# Patient Record
Sex: FEMALE | Race: BLACK OR AFRICAN AMERICAN | NOT HISPANIC OR LATINO | Employment: OTHER | URBAN - METROPOLITAN AREA
[De-identification: names, ages, dates, MRNs, and addresses within clinical notes are randomized per-mention and may not be internally consistent; named-entity substitution may affect disease eponyms.]

---

## 2023-08-16 ENCOUNTER — HOSPITAL ENCOUNTER (OUTPATIENT)
Dept: DATA CONVERSION | Facility: HOSPITAL | Age: 68
Discharge: HOME | End: 2023-08-16
Payer: MEDICARE

## 2023-08-16 DIAGNOSIS — C50.912 MALIGNANT NEOPLASM OF UNSPECIFIED SITE OF LEFT FEMALE BREAST (MULTI): ICD-10-CM

## 2023-08-16 LAB
ALBUMIN SERPL-MCNC: 4 GM/DL (ref 3.5–5)
ALBUMIN/GLOB SERPL: 1.1 RATIO (ref 1.5–3)
ALP BLD-CCNC: 82 U/L (ref 35–125)
ALT SERPL-CCNC: 14 U/L (ref 5–40)
ANION GAP SERPL CALCULATED.3IONS-SCNC: 14 MMOL/L (ref 0–19)
AST SERPL-CCNC: 22 U/L (ref 5–40)
BASOPHILS # BLD AUTO: 0.02 K/UL (ref 0–0.22)
BASOPHILS NFR BLD AUTO: 0.3 % (ref 0–1)
BILIRUB SERPL-MCNC: 0.4 MG/DL (ref 0.1–1.2)
BUN SERPL-MCNC: 10 MG/DL (ref 8–25)
BUN/CREAT SERPL: 11.1 RATIO (ref 8–21)
CALCIUM SERPL-MCNC: 9.3 MG/DL (ref 8.5–10.4)
CHLORIDE SERPL-SCNC: 103 MMOL/L (ref 97–107)
CO2 SERPL-SCNC: 25 MMOL/L (ref 24–31)
CREAT SERPL-MCNC: 0.9 MG/DL (ref 0.4–1.6)
DEPRECATED RDW RBC AUTO: 43.2 FL (ref 37–54)
DIFFERENTIAL METHOD BLD: NORMAL
EOSINOPHIL # BLD AUTO: 0.11 K/UL (ref 0–0.45)
EOSINOPHIL NFR BLD: 1.7 % (ref 0–3)
ERYTHROCYTE [DISTWIDTH] IN BLOOD BY AUTOMATED COUNT: 12.8 % (ref 11.7–15)
FSH SERPL-ACNC: 107.7 MU/ML
GFR SERPL CREATININE-BSD FRML MDRD: 70 ML/MIN/1.73 M2
GLOBULIN SER-MCNC: 3.6 G/DL (ref 1.9–3.7)
GLUCOSE SERPL-MCNC: 99 MG/DL (ref 65–99)
HCT VFR BLD AUTO: 42.6 % (ref 36–44)
HGB BLD-MCNC: 14.2 GM/DL (ref 12–15)
IMM GRANULOCYTES # BLD AUTO: 0.02 K/UL (ref 0–0.1)
LYMPHOCYTES # BLD AUTO: 1.66 K/UL (ref 1.2–3.2)
LYMPHOCYTES NFR BLD MANUAL: 25 % (ref 20–40)
MCH RBC QN AUTO: 30.7 PG (ref 26–34)
MCHC RBC AUTO-ENTMCNC: 33.3 % (ref 31–37)
MCV RBC AUTO: 92 FL (ref 80–100)
MONOCYTES # BLD AUTO: 0.33 K/UL (ref 0–0.8)
MONOCYTES NFR BLD MANUAL: 5 % (ref 0–8)
NEUTROPHILS # BLD AUTO: 4.49 K/UL
NEUTROPHILS # BLD AUTO: 4.49 K/UL (ref 1.8–7.7)
NEUTROPHILS.IMMATURE NFR BLD: 0.3 % (ref 0–1)
NEUTS SEG NFR BLD: 67.7 % (ref 50–70)
NRBC BLD-RTO: 0 /100 WBC
PLATELET # BLD AUTO: 241 K/UL (ref 150–450)
PMV BLD AUTO: 11.7 CU (ref 7–12.6)
POTASSIUM SERPL-SCNC: 3.9 MMOL/L (ref 3.4–5.1)
PROT SERPL-MCNC: 7.6 G/DL (ref 5.9–7.9)
RBC # BLD AUTO: 4.63 M/UL (ref 4–4.9)
SODIUM SERPL-SCNC: 142 MMOL/L (ref 133–145)
WBC # BLD AUTO: 6.6 K/UL (ref 4.5–11)

## 2023-08-17 LAB — ESTRADIOL SERPL-MCNC: NORMAL PG/ML

## 2023-08-21 ENCOUNTER — HOSPITAL ENCOUNTER (OUTPATIENT)
Dept: DATA CONVERSION | Facility: HOSPITAL | Age: 68
End: 2023-08-21

## 2023-08-21 DIAGNOSIS — Z51.81 ENCOUNTER FOR THERAPEUTIC DRUG LEVEL MONITORING: ICD-10-CM

## 2023-08-21 DIAGNOSIS — C50.912 MALIGNANT NEOPLASM OF UNSPECIFIED SITE OF LEFT FEMALE BREAST (MULTI): ICD-10-CM

## 2023-09-21 ENCOUNTER — HOSPITAL ENCOUNTER (OUTPATIENT)
Dept: DATA CONVERSION | Facility: HOSPITAL | Age: 68
Discharge: HOME | End: 2023-09-21
Payer: MEDICARE

## 2023-09-21 DIAGNOSIS — C79.51 SECONDARY MALIGNANT NEOPLASM OF BONE (MULTI): ICD-10-CM

## 2023-09-21 DIAGNOSIS — C78.7 SECONDARY MALIGNANT NEOPLASM OF LIVER AND INTRAHEPATIC BILE DUCT (MULTI): ICD-10-CM

## 2023-09-21 DIAGNOSIS — C50.912 MALIGNANT NEOPLASM OF UNSPECIFIED SITE OF LEFT FEMALE BREAST (MULTI): ICD-10-CM

## 2023-09-21 PROBLEM — C44.509: Status: ACTIVE | Noted: 2023-09-21

## 2023-09-21 PROBLEM — C79.2 SECONDARY MALIGNANT NEOPLASM OF SKIN (MULTI): Status: ACTIVE | Noted: 2023-09-21

## 2023-09-21 PROBLEM — Q83.9 NIPPLE ANOMALY: Status: ACTIVE | Noted: 2023-09-21

## 2023-09-21 PROBLEM — I10 ESSENTIAL HYPERTENSION: Status: ACTIVE | Noted: 2023-09-21

## 2023-09-21 PROBLEM — C50.911 MALIGNANT NEOPLASM OF UNSPECIFIED SITE OF RIGHT FEMALE BREAST (MULTI): Status: ACTIVE | Noted: 2023-09-21

## 2023-09-21 PROBLEM — I10 ELEVATED BLOOD PRESSURE READING IN OFFICE WITH DIAGNOSIS OF HYPERTENSION: Status: ACTIVE | Noted: 2023-09-21

## 2023-09-21 PROBLEM — E55.9 VITAMIN D DEFICIENCY: Status: ACTIVE | Noted: 2023-09-21

## 2023-09-21 PROBLEM — I89.0 LYMPHEDEMA OF ARM: Status: ACTIVE | Noted: 2023-09-21

## 2023-09-21 RX ORDER — SENNOSIDES 8.6 MG/1
1 TABLET ORAL DAILY
COMMUNITY

## 2023-09-21 RX ORDER — TRIAMTERENE/HYDROCHLOROTHIAZID 37.5-25 MG
1 TABLET ORAL DAILY
COMMUNITY

## 2023-09-21 RX ORDER — AMLODIPINE BESYLATE 10 MG/1
1 TABLET ORAL DAILY
COMMUNITY
Start: 2021-04-21

## 2023-09-21 RX ORDER — HALOBETASOL PROPIONATE 0.5 MG/G
1 OINTMENT TOPICAL EVERY 12 HOURS
COMMUNITY
Start: 2020-11-20 | End: 2024-02-20 | Stop reason: ALTCHOICE

## 2023-09-22 VITALS — HEIGHT: 66 IN | BODY MASS INDEX: 28.98 KG/M2 | WEIGHT: 180.34 LBS

## 2023-09-22 DIAGNOSIS — C50.911 MALIGNANT NEOPLASM OF RIGHT FEMALE BREAST, UNSPECIFIED ESTROGEN RECEPTOR STATUS, UNSPECIFIED SITE OF BREAST (MULTI): Primary | ICD-10-CM

## 2023-09-22 RX ORDER — HEPARIN 100 UNIT/ML
500 SYRINGE INTRAVENOUS AS NEEDED
Status: CANCELLED | OUTPATIENT
Start: 2023-09-30

## 2023-09-22 RX ORDER — HEPARIN SODIUM,PORCINE/PF 10 UNIT/ML
50 SYRINGE (ML) INTRAVENOUS AS NEEDED
Status: CANCELLED | OUTPATIENT
Start: 2023-09-30

## 2023-10-06 ENCOUNTER — INFUSION (OUTPATIENT)
Dept: HEMATOLOGY/ONCOLOGY | Facility: CLINIC | Age: 68
End: 2023-10-06
Payer: MEDICARE

## 2023-10-06 VITALS
SYSTOLIC BLOOD PRESSURE: 163 MMHG | HEART RATE: 105 BPM | RESPIRATION RATE: 18 BRPM | TEMPERATURE: 96.8 F | BODY MASS INDEX: 29.27 KG/M2 | WEIGHT: 181.88 LBS | DIASTOLIC BLOOD PRESSURE: 99 MMHG | OXYGEN SATURATION: 98 %

## 2023-10-06 DIAGNOSIS — C50.911 MALIGNANT NEOPLASM OF RIGHT FEMALE BREAST, UNSPECIFIED ESTROGEN RECEPTOR STATUS, UNSPECIFIED SITE OF BREAST (MULTI): ICD-10-CM

## 2023-10-06 PROCEDURE — 2500000004 HC RX 250 GENERAL PHARMACY W/ HCPCS (ALT 636 FOR OP/ED): Performed by: INTERNAL MEDICINE

## 2023-10-06 PROCEDURE — 96409 CHEMO IV PUSH SNGL DRUG: CPT

## 2023-10-06 RX ORDER — HEPARIN SODIUM,PORCINE/PF 10 UNIT/ML
50 SYRINGE (ML) INTRAVENOUS AS NEEDED
Status: DISCONTINUED | OUTPATIENT
Start: 2023-10-06 | End: 2023-10-06 | Stop reason: HOSPADM

## 2023-10-06 RX ORDER — PROCHLORPERAZINE EDISYLATE 5 MG/ML
10 INJECTION INTRAMUSCULAR; INTRAVENOUS EVERY 6 HOURS PRN
Status: DISCONTINUED | OUTPATIENT
Start: 2023-10-06 | End: 2023-10-06 | Stop reason: HOSPADM

## 2023-10-06 RX ORDER — PROCHLORPERAZINE MALEATE 10 MG
10 TABLET ORAL EVERY 6 HOURS PRN
Status: DISCONTINUED | OUTPATIENT
Start: 2023-10-06 | End: 2023-10-06 | Stop reason: HOSPADM

## 2023-10-06 RX ORDER — HEPARIN 100 UNIT/ML
500 SYRINGE INTRAVENOUS AS NEEDED
Status: DISCONTINUED | OUTPATIENT
Start: 2023-10-06 | End: 2023-10-06 | Stop reason: HOSPADM

## 2023-10-06 RX ORDER — HEPARIN 100 UNIT/ML
500 SYRINGE INTRAVENOUS AS NEEDED
Status: CANCELLED | OUTPATIENT
Start: 2023-10-06

## 2023-10-06 RX ORDER — DIPHENHYDRAMINE HYDROCHLORIDE 50 MG/ML
50 INJECTION INTRAMUSCULAR; INTRAVENOUS AS NEEDED
Status: DISCONTINUED | OUTPATIENT
Start: 2023-10-06 | End: 2023-10-06 | Stop reason: HOSPADM

## 2023-10-06 RX ORDER — HEPARIN SODIUM,PORCINE/PF 10 UNIT/ML
50 SYRINGE (ML) INTRAVENOUS AS NEEDED
Status: CANCELLED | OUTPATIENT
Start: 2023-10-06

## 2023-10-06 RX ORDER — ALBUTEROL SULFATE 0.83 MG/ML
3 SOLUTION RESPIRATORY (INHALATION) AS NEEDED
Status: DISCONTINUED | OUTPATIENT
Start: 2023-10-06 | End: 2023-10-06 | Stop reason: HOSPADM

## 2023-10-06 RX ORDER — FAMOTIDINE 10 MG/ML
20 INJECTION INTRAVENOUS ONCE AS NEEDED
Status: DISCONTINUED | OUTPATIENT
Start: 2023-10-06 | End: 2023-10-06 | Stop reason: HOSPADM

## 2023-10-06 RX ORDER — EPINEPHRINE 0.3 MG/.3ML
0.3 INJECTION SUBCUTANEOUS EVERY 5 MIN PRN
Status: DISCONTINUED | OUTPATIENT
Start: 2023-10-06 | End: 2023-10-06 | Stop reason: HOSPADM

## 2023-10-06 RX ADMIN — TRASTUZUMAB 491 MG: 150 INJECTION, POWDER, LYOPHILIZED, FOR SOLUTION INTRAVENOUS at 15:24

## 2023-10-06 ASSESSMENT — COLUMBIA-SUICIDE SEVERITY RATING SCALE - C-SSRS
6. HAVE YOU EVER DONE ANYTHING, STARTED TO DO ANYTHING, OR PREPARED TO DO ANYTHING TO END YOUR LIFE?: NO
2. HAVE YOU ACTUALLY HAD ANY THOUGHTS OF KILLING YOURSELF?: NO
1. IN THE PAST MONTH, HAVE YOU WISHED YOU WERE DEAD OR WISHED YOU COULD GO TO SLEEP AND NOT WAKE UP?: NO

## 2023-10-06 ASSESSMENT — ENCOUNTER SYMPTOMS
LOSS OF SENSATION IN FEET: 0
OCCASIONAL FEELINGS OF UNSTEADINESS: 0
DEPRESSION: 0

## 2023-10-06 ASSESSMENT — PAIN SCALES - GENERAL: PAINLEVEL: 0-NO PAIN

## 2023-10-06 ASSESSMENT — PATIENT HEALTH QUESTIONNAIRE - PHQ9
SUM OF ALL RESPONSES TO PHQ9 QUESTIONS 1 AND 2: 0
1. LITTLE INTEREST OR PLEASURE IN DOING THINGS: NOT AT ALL
2. FEELING DOWN, DEPRESSED OR HOPELESS: NOT AT ALL

## 2023-10-18 ENCOUNTER — HOSPITAL ENCOUNTER (OUTPATIENT)
Dept: CARDIOLOGY | Facility: HOSPITAL | Age: 68
Discharge: HOME | End: 2023-10-18
Payer: MEDICARE

## 2023-10-18 DIAGNOSIS — Z79.899 ENCOUNTER FOR MONITORING CARDIOTOXIC DRUG THERAPY: ICD-10-CM

## 2023-10-18 DIAGNOSIS — Z51.81 ENCOUNTER FOR MONITORING CARDIOTOXIC DRUG THERAPY: ICD-10-CM

## 2023-10-18 LAB — EJECTION FRACTION APICAL 4 CHAMBER: 64

## 2023-10-18 PROCEDURE — 76376 3D RENDER W/INTRP POSTPROCES: CPT

## 2023-10-18 PROCEDURE — 93306 TTE W/DOPPLER COMPLETE: CPT | Performed by: INTERNAL MEDICINE

## 2023-10-18 PROCEDURE — 93356 MYOCRD STRAIN IMG SPCKL TRCK: CPT | Performed by: INTERNAL MEDICINE

## 2023-10-18 PROCEDURE — 76376 3D RENDER W/INTRP POSTPROCES: CPT | Performed by: INTERNAL MEDICINE

## 2023-10-18 PROCEDURE — 93306 TTE W/DOPPLER COMPLETE: CPT

## 2023-10-20 ENCOUNTER — APPOINTMENT (OUTPATIENT)
Dept: HEMATOLOGY/ONCOLOGY | Facility: CLINIC | Age: 68
End: 2023-10-20
Payer: MEDICARE

## 2023-10-27 ENCOUNTER — APPOINTMENT (OUTPATIENT)
Dept: HEMATOLOGY/ONCOLOGY | Facility: CLINIC | Age: 68
End: 2023-10-27
Payer: MEDICARE

## 2023-11-01 RX ORDER — DIPHENHYDRAMINE HYDROCHLORIDE 50 MG/ML
50 INJECTION INTRAMUSCULAR; INTRAVENOUS AS NEEDED
Status: CANCELLED | OUTPATIENT
Start: 2023-11-03

## 2023-11-01 RX ORDER — EPINEPHRINE 0.3 MG/.3ML
0.3 INJECTION SUBCUTANEOUS EVERY 5 MIN PRN
Status: CANCELLED | OUTPATIENT
Start: 2023-11-03

## 2023-11-01 RX ORDER — ALBUTEROL SULFATE 0.83 MG/ML
3 SOLUTION RESPIRATORY (INHALATION) AS NEEDED
Status: CANCELLED | OUTPATIENT
Start: 2023-11-03

## 2023-11-01 RX ORDER — PROCHLORPERAZINE MALEATE 10 MG
10 TABLET ORAL EVERY 6 HOURS PRN
Status: CANCELLED | OUTPATIENT
Start: 2023-11-03

## 2023-11-01 RX ORDER — PROCHLORPERAZINE EDISYLATE 5 MG/ML
10 INJECTION INTRAMUSCULAR; INTRAVENOUS EVERY 6 HOURS PRN
Status: CANCELLED | OUTPATIENT
Start: 2023-11-03

## 2023-11-01 RX ORDER — FAMOTIDINE 10 MG/ML
20 INJECTION INTRAVENOUS ONCE AS NEEDED
Status: CANCELLED | OUTPATIENT
Start: 2023-11-03

## 2023-11-03 ENCOUNTER — INFUSION (OUTPATIENT)
Dept: HEMATOLOGY/ONCOLOGY | Facility: CLINIC | Age: 68
End: 2023-11-03
Payer: MEDICARE

## 2023-11-03 VITALS
SYSTOLIC BLOOD PRESSURE: 156 MMHG | TEMPERATURE: 96.8 F | BODY MASS INDEX: 29.41 KG/M2 | WEIGHT: 182.76 LBS | RESPIRATION RATE: 18 BRPM | DIASTOLIC BLOOD PRESSURE: 97 MMHG | OXYGEN SATURATION: 98 %

## 2023-11-03 DIAGNOSIS — C50.911 MALIGNANT NEOPLASM OF RIGHT FEMALE BREAST, UNSPECIFIED ESTROGEN RECEPTOR STATUS, UNSPECIFIED SITE OF BREAST (MULTI): ICD-10-CM

## 2023-11-03 PROCEDURE — 2500000004 HC RX 250 GENERAL PHARMACY W/ HCPCS (ALT 636 FOR OP/ED): Mod: JW,JG | Performed by: INTERNAL MEDICINE

## 2023-11-03 PROCEDURE — 96409 CHEMO IV PUSH SNGL DRUG: CPT

## 2023-11-03 RX ORDER — DIPHENHYDRAMINE HYDROCHLORIDE 50 MG/ML
50 INJECTION INTRAMUSCULAR; INTRAVENOUS AS NEEDED
Status: DISCONTINUED | OUTPATIENT
Start: 2023-11-03 | End: 2023-11-03 | Stop reason: HOSPADM

## 2023-11-03 RX ORDER — HEPARIN 100 UNIT/ML
500 SYRINGE INTRAVENOUS AS NEEDED
Status: CANCELLED | OUTPATIENT
Start: 2023-11-03

## 2023-11-03 RX ORDER — EPINEPHRINE 0.3 MG/.3ML
0.3 INJECTION SUBCUTANEOUS EVERY 5 MIN PRN
Status: DISCONTINUED | OUTPATIENT
Start: 2023-11-03 | End: 2023-11-03 | Stop reason: HOSPADM

## 2023-11-03 RX ORDER — FAMOTIDINE 10 MG/ML
20 INJECTION INTRAVENOUS ONCE AS NEEDED
Status: DISCONTINUED | OUTPATIENT
Start: 2023-11-03 | End: 2023-11-03 | Stop reason: HOSPADM

## 2023-11-03 RX ORDER — HEPARIN SODIUM,PORCINE/PF 10 UNIT/ML
50 SYRINGE (ML) INTRAVENOUS AS NEEDED
Status: CANCELLED | OUTPATIENT
Start: 2023-11-03

## 2023-11-03 RX ORDER — PROCHLORPERAZINE MALEATE 10 MG
10 TABLET ORAL EVERY 6 HOURS PRN
Status: DISCONTINUED | OUTPATIENT
Start: 2023-11-03 | End: 2023-11-03 | Stop reason: HOSPADM

## 2023-11-03 RX ORDER — PROCHLORPERAZINE EDISYLATE 5 MG/ML
10 INJECTION INTRAMUSCULAR; INTRAVENOUS EVERY 6 HOURS PRN
Status: DISCONTINUED | OUTPATIENT
Start: 2023-11-03 | End: 2023-11-03 | Stop reason: HOSPADM

## 2023-11-03 RX ORDER — ALBUTEROL SULFATE 0.83 MG/ML
3 SOLUTION RESPIRATORY (INHALATION) AS NEEDED
Status: DISCONTINUED | OUTPATIENT
Start: 2023-11-03 | End: 2023-11-03 | Stop reason: HOSPADM

## 2023-11-03 RX ADMIN — TRASTUZUMAB 491.4 MG: 150 INJECTION, POWDER, LYOPHILIZED, FOR SOLUTION INTRAVENOUS at 11:55

## 2023-11-03 ASSESSMENT — PAIN SCALES - GENERAL: PAINLEVEL: 0-NO PAIN

## 2023-11-10 ENCOUNTER — APPOINTMENT (OUTPATIENT)
Dept: HEMATOLOGY/ONCOLOGY | Facility: CLINIC | Age: 68
End: 2023-11-10
Payer: MEDICARE

## 2023-11-15 ENCOUNTER — OFFICE VISIT (OUTPATIENT)
Dept: HEMATOLOGY/ONCOLOGY | Facility: CLINIC | Age: 68
End: 2023-11-15
Payer: MEDICARE

## 2023-11-15 VITALS
BODY MASS INDEX: 29 KG/M2 | DIASTOLIC BLOOD PRESSURE: 124 MMHG | RESPIRATION RATE: 18 BRPM | OXYGEN SATURATION: 98 % | HEART RATE: 107 BPM | SYSTOLIC BLOOD PRESSURE: 177 MMHG | TEMPERATURE: 96.6 F | WEIGHT: 180.23 LBS

## 2023-11-15 DIAGNOSIS — C50.911 MALIGNANT NEOPLASM OF RIGHT FEMALE BREAST, UNSPECIFIED ESTROGEN RECEPTOR STATUS, UNSPECIFIED SITE OF BREAST (MULTI): Primary | ICD-10-CM

## 2023-11-15 DIAGNOSIS — I89.0 LYMPHEDEMA OF ARM: ICD-10-CM

## 2023-11-15 DIAGNOSIS — I10 ESSENTIAL HYPERTENSION: ICD-10-CM

## 2023-11-15 PROCEDURE — 1160F RVW MEDS BY RX/DR IN RCRD: CPT | Performed by: INTERNAL MEDICINE

## 2023-11-15 PROCEDURE — 1036F TOBACCO NON-USER: CPT | Performed by: INTERNAL MEDICINE

## 2023-11-15 PROCEDURE — 3077F SYST BP >= 140 MM HG: CPT | Performed by: INTERNAL MEDICINE

## 2023-11-15 PROCEDURE — 1159F MED LIST DOCD IN RCRD: CPT | Performed by: INTERNAL MEDICINE

## 2023-11-15 PROCEDURE — 99417 PROLNG OP E/M EACH 15 MIN: CPT | Performed by: INTERNAL MEDICINE

## 2023-11-15 PROCEDURE — 3080F DIAST BP >= 90 MM HG: CPT | Performed by: INTERNAL MEDICINE

## 2023-11-15 PROCEDURE — 99215 OFFICE O/P EST HI 40 MIN: CPT | Performed by: INTERNAL MEDICINE

## 2023-11-15 PROCEDURE — 1126F AMNT PAIN NOTED NONE PRSNT: CPT | Performed by: INTERNAL MEDICINE

## 2023-11-15 RX ORDER — DIPHENHYDRAMINE HYDROCHLORIDE 50 MG/ML
50 INJECTION INTRAMUSCULAR; INTRAVENOUS AS NEEDED
Status: CANCELLED | OUTPATIENT
Start: 2024-04-03

## 2023-11-15 RX ORDER — EPINEPHRINE 0.3 MG/.3ML
0.3 INJECTION SUBCUTANEOUS EVERY 5 MIN PRN
Status: CANCELLED | OUTPATIENT
Start: 2023-12-01

## 2023-11-15 RX ORDER — PROCHLORPERAZINE MALEATE 10 MG
10 TABLET ORAL EVERY 6 HOURS PRN
Status: CANCELLED | OUTPATIENT
Start: 2024-02-20

## 2023-11-15 RX ORDER — PROCHLORPERAZINE MALEATE 10 MG
10 TABLET ORAL EVERY 6 HOURS PRN
Status: CANCELLED | OUTPATIENT
Start: 2024-04-03

## 2023-11-15 RX ORDER — PROCHLORPERAZINE EDISYLATE 5 MG/ML
10 INJECTION INTRAMUSCULAR; INTRAVENOUS EVERY 6 HOURS PRN
Status: CANCELLED | OUTPATIENT
Start: 2024-01-30

## 2023-11-15 RX ORDER — FAMOTIDINE 10 MG/ML
20 INJECTION INTRAVENOUS ONCE AS NEEDED
Status: CANCELLED | OUTPATIENT
Start: 2024-01-30

## 2023-11-15 RX ORDER — DIPHENHYDRAMINE HYDROCHLORIDE 50 MG/ML
50 INJECTION INTRAMUSCULAR; INTRAVENOUS AS NEEDED
Status: CANCELLED | OUTPATIENT
Start: 2024-02-20

## 2023-11-15 RX ORDER — ALBUTEROL SULFATE 0.83 MG/ML
3 SOLUTION RESPIRATORY (INHALATION) AS NEEDED
Status: CANCELLED | OUTPATIENT
Start: 2023-12-22

## 2023-11-15 RX ORDER — ALBUTEROL SULFATE 0.83 MG/ML
3 SOLUTION RESPIRATORY (INHALATION) AS NEEDED
Status: CANCELLED | OUTPATIENT
Start: 2024-02-20

## 2023-11-15 RX ORDER — FAMOTIDINE 10 MG/ML
20 INJECTION INTRAVENOUS ONCE AS NEEDED
Status: CANCELLED | OUTPATIENT
Start: 2023-12-01

## 2023-11-15 RX ORDER — PROCHLORPERAZINE EDISYLATE 5 MG/ML
10 INJECTION INTRAMUSCULAR; INTRAVENOUS EVERY 6 HOURS PRN
Status: CANCELLED | OUTPATIENT
Start: 2023-12-01

## 2023-11-15 RX ORDER — DIPHENHYDRAMINE HYDROCHLORIDE 50 MG/ML
50 INJECTION INTRAMUSCULAR; INTRAVENOUS AS NEEDED
Status: CANCELLED | OUTPATIENT
Start: 2023-12-22

## 2023-11-15 RX ORDER — PROCHLORPERAZINE EDISYLATE 5 MG/ML
10 INJECTION INTRAMUSCULAR; INTRAVENOUS EVERY 6 HOURS PRN
Status: CANCELLED | OUTPATIENT
Start: 2024-04-03

## 2023-11-15 RX ORDER — EPINEPHRINE 0.3 MG/.3ML
0.3 INJECTION SUBCUTANEOUS EVERY 5 MIN PRN
Status: CANCELLED | OUTPATIENT
Start: 2024-01-30

## 2023-11-15 RX ORDER — DIPHENHYDRAMINE HYDROCHLORIDE 50 MG/ML
50 INJECTION INTRAMUSCULAR; INTRAVENOUS AS NEEDED
Status: CANCELLED | OUTPATIENT
Start: 2024-01-30

## 2023-11-15 RX ORDER — ALBUTEROL SULFATE 0.83 MG/ML
3 SOLUTION RESPIRATORY (INHALATION) AS NEEDED
Status: CANCELLED | OUTPATIENT
Start: 2024-01-30

## 2023-11-15 RX ORDER — ALBUTEROL SULFATE 0.83 MG/ML
3 SOLUTION RESPIRATORY (INHALATION) AS NEEDED
Status: CANCELLED | OUTPATIENT
Start: 2024-04-03

## 2023-11-15 RX ORDER — EPINEPHRINE 0.3 MG/.3ML
0.3 INJECTION SUBCUTANEOUS EVERY 5 MIN PRN
Status: CANCELLED | OUTPATIENT
Start: 2023-12-22

## 2023-11-15 RX ORDER — PROCHLORPERAZINE MALEATE 10 MG
10 TABLET ORAL EVERY 6 HOURS PRN
Status: CANCELLED | OUTPATIENT
Start: 2024-01-30

## 2023-11-15 RX ORDER — ALBUTEROL SULFATE 0.83 MG/ML
3 SOLUTION RESPIRATORY (INHALATION) AS NEEDED
Status: CANCELLED | OUTPATIENT
Start: 2023-12-01

## 2023-11-15 RX ORDER — PROCHLORPERAZINE MALEATE 10 MG
10 TABLET ORAL EVERY 6 HOURS PRN
Status: CANCELLED | OUTPATIENT
Start: 2023-12-22

## 2023-11-15 RX ORDER — PROCHLORPERAZINE EDISYLATE 5 MG/ML
10 INJECTION INTRAMUSCULAR; INTRAVENOUS EVERY 6 HOURS PRN
Status: CANCELLED | OUTPATIENT
Start: 2023-12-22

## 2023-11-15 RX ORDER — FAMOTIDINE 10 MG/ML
20 INJECTION INTRAVENOUS ONCE AS NEEDED
Status: CANCELLED | OUTPATIENT
Start: 2024-04-03

## 2023-11-15 RX ORDER — EPINEPHRINE 0.3 MG/.3ML
0.3 INJECTION SUBCUTANEOUS EVERY 5 MIN PRN
Status: CANCELLED | OUTPATIENT
Start: 2024-04-03

## 2023-11-15 RX ORDER — DIPHENHYDRAMINE HYDROCHLORIDE 50 MG/ML
50 INJECTION INTRAMUSCULAR; INTRAVENOUS AS NEEDED
Status: CANCELLED | OUTPATIENT
Start: 2023-12-01

## 2023-11-15 RX ORDER — PROCHLORPERAZINE EDISYLATE 5 MG/ML
10 INJECTION INTRAMUSCULAR; INTRAVENOUS EVERY 6 HOURS PRN
Status: CANCELLED | OUTPATIENT
Start: 2024-02-20

## 2023-11-15 RX ORDER — PROCHLORPERAZINE MALEATE 10 MG
10 TABLET ORAL EVERY 6 HOURS PRN
Status: CANCELLED | OUTPATIENT
Start: 2023-12-01

## 2023-11-15 RX ORDER — FAMOTIDINE 10 MG/ML
20 INJECTION INTRAVENOUS ONCE AS NEEDED
Status: CANCELLED | OUTPATIENT
Start: 2024-02-20

## 2023-11-15 RX ORDER — FAMOTIDINE 10 MG/ML
20 INJECTION INTRAVENOUS ONCE AS NEEDED
Status: CANCELLED | OUTPATIENT
Start: 2023-12-22

## 2023-11-15 RX ORDER — EPINEPHRINE 0.3 MG/.3ML
0.3 INJECTION SUBCUTANEOUS EVERY 5 MIN PRN
Status: CANCELLED | OUTPATIENT
Start: 2024-02-20

## 2023-11-15 ASSESSMENT — COLUMBIA-SUICIDE SEVERITY RATING SCALE - C-SSRS
6. HAVE YOU EVER DONE ANYTHING, STARTED TO DO ANYTHING, OR PREPARED TO DO ANYTHING TO END YOUR LIFE?: NO
1. IN THE PAST MONTH, HAVE YOU WISHED YOU WERE DEAD OR WISHED YOU COULD GO TO SLEEP AND NOT WAKE UP?: NO
2. HAVE YOU ACTUALLY HAD ANY THOUGHTS OF KILLING YOURSELF?: NO

## 2023-11-15 ASSESSMENT — PAIN SCALES - GENERAL: PAINLEVEL: 0-NO PAIN

## 2023-11-15 ASSESSMENT — ENCOUNTER SYMPTOMS
LOSS OF SENSATION IN FEET: 0
DEPRESSION: 0
OCCASIONAL FEELINGS OF UNSTEADINESS: 0

## 2023-11-15 ASSESSMENT — PATIENT HEALTH QUESTIONNAIRE - PHQ9
1. LITTLE INTEREST OR PLEASURE IN DOING THINGS: NOT AT ALL
SUM OF ALL RESPONSES TO PHQ9 QUESTIONS 1 AND 2: 0
2. FEELING DOWN, DEPRESSED OR HOPELESS: NOT AT ALL

## 2023-11-16 ENCOUNTER — HOSPITAL ENCOUNTER (OUTPATIENT)
Dept: RADIOLOGY | Facility: HOSPITAL | Age: 68
Discharge: HOME | End: 2023-11-16
Payer: MEDICARE

## 2023-11-16 DIAGNOSIS — C50.911 MALIGNANT NEOPLASM OF RIGHT FEMALE BREAST, UNSPECIFIED ESTROGEN RECEPTOR STATUS, UNSPECIFIED SITE OF BREAST (MULTI): ICD-10-CM

## 2023-11-16 PROCEDURE — 76982 USE 1ST TARGET LESION: CPT | Mod: RT

## 2023-11-16 PROCEDURE — 76642 ULTRASOUND BREAST LIMITED: CPT | Mod: RT

## 2023-11-17 ENCOUNTER — APPOINTMENT (OUTPATIENT)
Dept: HEMATOLOGY/ONCOLOGY | Facility: CLINIC | Age: 68
End: 2023-11-17
Payer: MEDICARE

## 2023-11-17 PROBLEM — C44.509: Status: RESOLVED | Noted: 2023-09-21 | Resolved: 2023-11-17

## 2023-11-17 PROBLEM — C79.2 SECONDARY MALIGNANT NEOPLASM OF SKIN (MULTI): Status: RESOLVED | Noted: 2023-09-21 | Resolved: 2023-11-17

## 2023-11-17 RX ORDER — ANASTROZOLE 1 MG/1
1 TABLET ORAL DAILY
COMMUNITY
End: 2024-01-17 | Stop reason: SINTOL

## 2023-11-18 NOTE — PROGRESS NOTES
DIVISION OF MEDICAL ONCOLOGY    Patient ID: Debra Sheppard is a 68 y.o. female.  MRN: [id number]  : 1955  The patient presents to clinic today for her history of metastatic breast cancer.    Diagnostic/Therapeutic History:  Diagnosis: Breast cancer, Stage IV (Primary, Right breast overlapping sites, T4c, NX, cM1, G2, ER Status: Positive, PA Status: Negative, HER-2/yue  Status: Positive)-Clinical ).     She noted a right breast mass for several years. This progressively worsened with involvement of the right chest wall, shoulder along with a mass on her back. She had a CT scan of her chest and abdomen that showed multiple hypodense lesions throughout  the liver- borderline left external iliac lymph nodes that are multiple small soft tissue nodules present in the right anterior omentum. -3.1 x 2.5 soft tissue lesion in the subcutaneous tissue of the posterior chest. There is tiny nonspecific sclerotic  lesion in the left femoral neck, right iliac and left acetabulum.  Her CT scan of the chest revealed several nodules in the lung bilaterally. There is also multiple large axillary lymph nodes along with large infiltrative right breast mass involving the entire breast and underlying chest wall. She has a 2.9 soft tissue  nodule in the chest posteriorly to the left.  She was not agreeable to chemotherapy, but to Herceptin and anastrozole.  She was not interested in bisphosphonates or denosumab.    History of Present Illness (HPI)/Interval History:  Debra Sheppard presents today for routine FUV.  She has noted some right hand swelling recently. No pain.  No pain in the breast or reported changes.  She had a recent URI, seen at [facility], given a Z-pack with improvement. Occasional mucous noted, but no fevers or worsening symptoms.  No dyspnea, abdominal pain, nausea.  She had some hives after her last infusion- these have now resolved. She reiterates the need for brand-name Herceptin, not generic trastuzumab.  She is interested  in checking her estrogen/FSH levels and considering decreasing her anastrozole dose, if possible.  She wonders what Dr. Stallworth (her prior oncologist) wanted to switch her treatment regimen to, and she would like me to review prior notes from years ago and let her know.    Review of Systems:  14-point ROS otherwise negative, as per HPI/Interval History.    Past Medical History:   Diagnosis Date    Breast cancer (CMS/HCC)     Hypertension      Patient Active Problem List   Diagnosis    Elevated blood pressure reading in office with diagnosis of hypertension    Essential hypertension    Lymphedema of arm    Malignant neoplasm of unspecified site of right female breast (CMS/HCC)    Nipple anomaly    Vitamin D deficiency      Social History     Socioeconomic History    Marital status:      Spouse name: None    Number of children: None    Years of education: None    Highest education level: None   Occupational History    None   Tobacco Use    Smoking status: Never     Passive exposure: Never    Smokeless tobacco: Never   Vaping Use    Vaping Use: Never used   Substance and Sexual Activity    Alcohol use: Never    Drug use: Never    Sexual activity: None   Other Topics Concern    None   Social History Narrative    None     Social Determinants of Health     Financial Resource Strain: Not on file   Food Insecurity: Not on file   Transportation Needs: Not on file   Physical Activity: Not on file   Stress: Not on file   Social Connections: Not on file   Intimate Partner Violence: Not on file   Housing Stability: Not on file       Family History   Problem Relation Name Age of Onset    Breast cancer Sister          Triple-negative BC       Allergies:   Allergies   Allergen Reactions    Ciprofloxacin Itching    Latex Unknown    Penicillins Unknown    Surgical Lubricant Jelly Unknown    Adhesive Rash         Current Outpatient Medications:     amLODIPine (Norvasc) 10 mg tablet, Take 1 tablet (10 mg) by mouth once daily.,  Disp: , Rfl:     anastrozole (Arimidex) 1 mg tablet, Take 1 tablet (1 mg total) by mouth once daily.  Swallow whole with a drink of water., Disp: , Rfl:     camphor-menthoL (Sarna) lotion, Apply topically if needed for itching. Take per directed, Disp: , Rfl:     halobetasol (UltraVATE) 0.05 % ointment, 1 Application every 12 hours. Take per directed, Disp: , Rfl:     sennosides (Senokot) 8.6 mg tablet, Take 1 tablet (8.6 mg) by mouth once daily. Pv Senna-S 60 Ct, Disp: , Rfl:     triamterene-hydrochlorothiazid (Maxzide-25) 37.5-25 mg tablet, Take 1 tablet by mouth once daily., Disp: , Rfl:      Objective    BSA: 1.95 meters squared  BP (!) 177/124   Pulse 107   Temp 35.9 °C (96.6 °F) (Temporal)   Resp 18   Wt 81.8 kg (180 lb 3.6 oz)   SpO2 98%   BMI 29.00 kg/m²     ECOG Performance Status:  The ECOG performance scale today is ECO- Restricted in physically strenuous activity.  Carries out light duty.    Physical Exam  Constitutional:       General: She is not in acute distress.     Appearance: Normal appearance.   HENT:      Head: Normocephalic and atraumatic.   Eyes:      General: No scleral icterus.  Cardiovascular:      Rate and Rhythm: Normal rate and regular rhythm.      Heart sounds: No murmur heard.  Pulmonary:      Effort: No respiratory distress.      Breath sounds: Normal breath sounds.   Musculoskeletal:      Right lower leg: No edema.      Left lower leg: No edema.      Comments: Right hand edema noted. No edema right arm. +Right axillary fullness noted without discrete mass.   Skin:     General: Skin is warm and dry.   Neurological:      General: No focal deficit present.      Mental Status: She is alert and oriented to person, place, and time. Mental status is at baseline.      Gait: Gait normal.         Laboratory Data:  Lab Results   Component Value Date    WBC 6.6 2023    HGB 14.2 2023    HCT 42.6 2023    MCV 92.0 2023     2023       Chemistry    Lab  Results   Component Value Date/Time     08/16/2023 1400    K 3.9 08/16/2023 1400     08/16/2023 1400    CO2 25 08/16/2023 1400    BUN 10 08/16/2023 1400    CREATININE 0.9 08/16/2023 1400    Lab Results   Component Value Date/Time    CALCIUM 9.3 08/16/2023 1400    ALKPHOS 82 08/16/2023 1400    AST 22 08/16/2023 1400    ALT 14 08/16/2023 1400    BILITOT 0.4 08/16/2023 1400        Radiology:  STUDY:  BI US BREAST LIMITED RIGHT; 11/16/2023 8:04 am      INDICATION:  Signs/Symptoms:Right axillary fullness and arm swelling.. Patient has  known breast malignancy with metastatic disease. Recent CT 09/21/2023  demonstrated right breast mass along with right axillary tail mass.      COMPARISON:  Only available comparison study is the CT 09/21/2023 and an  ultrasound of the right breast from 06/13/2022 from [facility].      ACCESSION NUMBER(S):  [id number]      ORDERING CLINICIAN:  LAURENCE TEMPLETON      TECHNIQUE:  Grey scale duplex image of the breast tissue was obtained.      FINDINGS:  Exam was targeted to the patient's right breast mass and right  axillary tail mass.      *Within the right axillary tail there is a solid macrolobulated mass  with marked increased stiffness on elastography without internal  vascularity demonstrated measuring 2.0 x 2.2 x 2.4 cm. On the outside  previous examination the axillary tail mass measuring approximately  1.2 x 1.8 cm. *There is a right breast mass 11 o'clock position with  a distance nipple of 4 cm measuring approximately 1.0 x 1.0 x 1.3 cm  demonstrating spiculated margins with acoustic shadowing and  increased stiffness on elastography. On the previous ultrasound exam  this measured approximately 0.4 x 0.4 x 0.7 cm.      IMPRESSION:  Examination was targeted to the right breast mass and right axillary  tail mass as detailed above both of which appear to have increased in  size compared to 06/13/2022 ultrasound study. No previous  mammographic imaging is  available.    STUDY:  CHEST ABD PELV WO IV 9/21/2023 11:00 am     INDICATION:  MALIGNANT NEOPLASM OF UNSPECIFIED SITE OF LEFT FEMALE BREAST 68-year-old  woman with reported history of breast cancer and liver malignancy. Presents  for restaging examination.     COMPARISON:  No prior CT studies available in our system for direct comparison. I have  reached out to  [facility] who spent the day searching for available prior  studies. However, no recent previous CT could be found.     ACCESSION NUMBER(S):  [id number]     ORDERING CLINICIAN:  KARELY NUNEZ     TECHNIQUE:  CT axial images through the chest, abdomen, and pelvis without intravenous  contrast. Oral contrast was not administered. Post processing sagittal and  coronal reconstruction images were also performed.     COMMENTS: This exam is performed without oral or intravenous contrast as was  requested. Please note that the absence of oral and intravenous contrast  material does limit the sensitivity and specificity of this examination. In  particular solid organ assessment for neoplasm is significantly limited.  Assessment of the GI tract is also limited without the aid of oral contrast  administration. Vascular abnormalities such as dissection, occlusions,  infarcts and thrombus may also go undetected.     FINDINGS:  CHEST:     NECK, THORACIC INLET, AXILLAE: Within the limitations of an unenhanced scan,  the imaged thyroid is unremarkable.     CORIN: Calcified left hilar lymph nodes are seen.     MEDIASTINUM: Mediastinal lymph nodes are not pathologically enlarged, within  the limitations of an unenhanced scan. The esophagus is patulous in  appearance.     HEART and PERICARDIUM: The ascending thoracic aorta measures 3.2 cm. The  main pulmonary artery is not enlarged. There is no pericardial effusion.     PLEURA: There is no pleural effusion or pneumothorax.     LUNGS/AIRWAYS: Central airways are patent.     Right lung: There is a 4 mm pulmonary nodule in the  right upper lobe, axial  image 132. Calcified right lung granuloma, axial image 132.     Left lung: 3-4 mm left perifissural nodule, axial image 145. 2 mm lingular  nodule, axial image 162.     CHEST CAGE/BONES: There is a 2.2 x 2.1 cm possible lateral right breast  mass, as seen on series 3, image 24.  multilevel discogenic degenerative  changes of the thoracic spine.              ABDOMEN:     HEPATOBILIARY: A subcentimeter hypodensity is identified at the hepatic  dome, axial image 73, too small to characterize by CT. The gallbladder is  unremarkable.     PANCREAS: The pancreas is unremarkable, within the limitations of this  unenhanced scan.     SPLEEN: The spleen is not enlarged. Calcified splenic granulomas are seen.     ADRENALS: The right and left adrenal glands are unremarkable.     URINARY: No sign of calcified renal collecting system stones or  hydronephrosis.     GASTROINTESTINAL: There is a small hiatal hernia. The stomach is contracted  and therefore not fully evaluated.  Visualized small bowel loops demonstrate  normal caliber. Portions of the large bowel are decompressed, limiting  evaluation for wall thickening. There is colonic diverticulosis. Moderate  amount of stool is seen throughout the colon. The appendix is unremarkable.     PELVIS: No sign of calcified urinary bladder stone. No sign of free pelvic  fluid. Several calcified pelvic phleboliths are present.     REPRODUCTIVE ORGANS: Suspected uterine fibroid (series 3, image 175).     LYMPH NODES: No sign of retroperitoneal or mesenteric lymphadenopathy.     VASCULAR: There is no abdominal aortic aneurysm.     BONES: Discogenic degenerative changes of the lumbar spine are most  pronounced at L5-S1. Vacuum disc phenomenon noted at this level.     SOFT TISSUES: Small fat containing umbilical hernia.     IMPRESSION:  1. Please note limitation in this restaging exam due to lack of IV and oral  contrast. Furthermore, despite efforts from the [facility]  team throughout the  day to obtain outside hospital prior studies, none could be found and  uploaded for direct comparison.     2. There is a 2.2 x 2.1 cm possible lateral right breast mass, series 3,  image 24. Correlation with patient's history, and potentially mammography,  recommended.     3. A 4 mm pulmonary nodule is seen in the right upper lobe. Left lung  pulmonary nodules measure up to 3-4 mm. Correlation with outside hospital  studies, if they can be obtained. Otherwise, by Fleischner Society  guidelines, chest CT follow-up in 12 months recommended for these nodules <6  mm.     4. No acute finding in the abdomen or pelvis.    Pathology: N/A      Assessment/Plan:  Debra Sheppard is a 68 y.o. female with a history of metastatic ER+/Her2+ breast cancer, who presents today for follow-up evaluation on anastrozole and trastuzumab (Herceptin).    Malignant neoplasm of unspecified site of right female breast (CMS/HCC)  Stage IV (de adalid), ER+/Her2+, with LN, subcutaneous tissue, bone, lung, liver metastases at diagnosis.    - Breast exam deferred today, per patient preference.  - Right axilla with fullness on exam. I recommended obtaining a right breast/axillary US for better evaluation. US study was performed day after clinic visit- compared to study from 6/2022, breast and axillary mass have increased in size. I am unable to contact the patient due to her desire to restrict all contact information in her chart. I have spoken with our nurse manager, and we will attempt to find a way to contact her prior to her next infusion.  - I am also unable to compare her CT CAP from 9/2023 to prior studies, as she restricts access to prior outside scans. She should consider updated CT (+/- bone scan), as her breast/axillary imaging has shown possible progression.  - I explained that I do not recommend atypical dosing of anastrozole. Continue 1 mg daily for now.  - Additional Herceptin infusions have been scheduled q21 days.  -  ECHO with normal LVEF 10/2023. Continue q6 month monitoring, next due 4/2024.    Essential hypertension  BP again elevated today, but asymptomatic.  Continue triamterene-HCTZ and amlodipine. Continue to follow with PCP.    Lymphedema of arm  Lymphedema noted in hand. She has a compression sleeve. Instructed to call with worsening symptoms.     Disposition.  - RTC ~12 weeks. Continue q3 week Herceptin.  - She has our contact information and was instructed to call with concerns/questions in the interim.    Moe Watts MD  Hematology and Medical Oncology  [facility]

## 2023-11-18 NOTE — ASSESSMENT & PLAN NOTE
Lymphedema noted in hand. She has a compression sleeve. Instructed to call with worsening symptoms.

## 2023-11-18 NOTE — ASSESSMENT & PLAN NOTE
BP again elevated today, but asymptomatic.  Continue triamterene-HCTZ and amlodipine. Continue to follow with PCP.

## 2023-11-18 NOTE — ASSESSMENT & PLAN NOTE
Stage IV (de adalid), ER+/Her2+, with LN, subcutaneous tissue, bone, lung, liver metastases at diagnosis.    - Breast exam deferred today, per patient preference.  - Right axilla with fullness on exam. I recommended obtaining a right breast/axillary US for better evaluation. US study was performed day after clinic visit- compared to study from 6/2022, breast and axillary mass have increased in size. I am unable to contact the patient due to her desire to restrict all contact information in her chart. I have spoken with our nurse manager, and we will attempt to find a way to contact her prior to her next infusion.  - I am also unable to compare her CT CAP from 9/2023 to prior studies, as she restricts access to prior outside scans. She should consider updated CT (+/- bone scan), as her breast/axillary imaging has shown possible progression.  - I explained that I do not recommend atypical dosing of anastrozole. Continue 1 mg daily for now.  - Additional Herceptin infusions have been scheduled q21 days.  - ECHO with normal LVEF 10/2023. Continue q6 month monitoring, next due 4/2024.

## 2023-12-01 ENCOUNTER — APPOINTMENT (OUTPATIENT)
Dept: HEMATOLOGY/ONCOLOGY | Facility: CLINIC | Age: 68
End: 2023-12-01
Payer: MEDICARE

## 2023-12-01 ENCOUNTER — DOCUMENTATION (OUTPATIENT)
Dept: HEMATOLOGY/ONCOLOGY | Facility: CLINIC | Age: 68
End: 2023-12-01
Payer: MEDICARE

## 2023-12-01 ENCOUNTER — INFUSION (OUTPATIENT)
Dept: HEMATOLOGY/ONCOLOGY | Facility: CLINIC | Age: 68
End: 2023-12-01
Payer: MEDICARE

## 2023-12-01 VITALS
DIASTOLIC BLOOD PRESSURE: 93 MMHG | OXYGEN SATURATION: 98 % | WEIGHT: 179.45 LBS | SYSTOLIC BLOOD PRESSURE: 150 MMHG | TEMPERATURE: 96.8 F | BODY MASS INDEX: 28.88 KG/M2

## 2023-12-01 DIAGNOSIS — C50.911 MALIGNANT NEOPLASM OF RIGHT FEMALE BREAST, UNSPECIFIED ESTROGEN RECEPTOR STATUS, UNSPECIFIED SITE OF BREAST (MULTI): ICD-10-CM

## 2023-12-01 PROCEDURE — 2500000004 HC RX 250 GENERAL PHARMACY W/ HCPCS (ALT 636 FOR OP/ED): Mod: JG | Performed by: INTERNAL MEDICINE

## 2023-12-01 PROCEDURE — 96413 CHEMO IV INFUSION 1 HR: CPT

## 2023-12-01 RX ORDER — ALBUTEROL SULFATE 0.83 MG/ML
3 SOLUTION RESPIRATORY (INHALATION) AS NEEDED
Status: DISCONTINUED | OUTPATIENT
Start: 2023-12-01 | End: 2023-12-01 | Stop reason: HOSPADM

## 2023-12-01 RX ORDER — EPINEPHRINE 0.3 MG/.3ML
0.3 INJECTION SUBCUTANEOUS EVERY 5 MIN PRN
Status: DISCONTINUED | OUTPATIENT
Start: 2023-12-01 | End: 2023-12-01 | Stop reason: HOSPADM

## 2023-12-01 RX ORDER — PROCHLORPERAZINE EDISYLATE 5 MG/ML
10 INJECTION INTRAMUSCULAR; INTRAVENOUS EVERY 6 HOURS PRN
Status: DISCONTINUED | OUTPATIENT
Start: 2023-12-01 | End: 2023-12-01 | Stop reason: HOSPADM

## 2023-12-01 RX ORDER — PROCHLORPERAZINE MALEATE 10 MG
10 TABLET ORAL EVERY 6 HOURS PRN
Status: DISCONTINUED | OUTPATIENT
Start: 2023-12-01 | End: 2023-12-01 | Stop reason: HOSPADM

## 2023-12-01 RX ORDER — DIPHENHYDRAMINE HYDROCHLORIDE 50 MG/ML
50 INJECTION INTRAMUSCULAR; INTRAVENOUS AS NEEDED
Status: DISCONTINUED | OUTPATIENT
Start: 2023-12-01 | End: 2023-12-01 | Stop reason: HOSPADM

## 2023-12-01 RX ORDER — FAMOTIDINE 10 MG/ML
20 INJECTION INTRAVENOUS ONCE AS NEEDED
Status: DISCONTINUED | OUTPATIENT
Start: 2023-12-01 | End: 2023-12-01 | Stop reason: HOSPADM

## 2023-12-01 RX ADMIN — TRASTUZUMAB 490.8 MG: 150 INJECTION, POWDER, LYOPHILIZED, FOR SOLUTION INTRAVENOUS at 15:45

## 2023-12-01 ASSESSMENT — PAIN SCALES - GENERAL: PAINLEVEL: 0-NO PAIN

## 2023-12-01 NOTE — PROGRESS NOTES
"Patient was seen in infusion today, room 10, prior to her port access and her treatment. She is seated in room , on bed, television on in back round , alone, pleasant and calm and welcomed my visit.    Debra started our visit by describing her health concerns in great detail. She described that she has concerns regarding her most recent scans and she states that per Dr. Watts , he had discussed  with her that ,\" He will monitor these areas of concern as they may be fungal in nature  or may be caused by an infection of 'histomosis'.\"which she states ,\"may have been contracted years ago which could have caused her asthma.\" She is asking if Dr. Watts will order the lab test to check this. Debra continues to describe other health concerns and when she was finished, I explained to her that Dr. Watts is a very thorough and competent physician who strives to provide her with the best quality of care possible. However, it was explained to her that is it very difficult for him to do so when he does not have a way for him to contact her with results, questions, concerns or  need for discussions regarding her treatment plans. She was asked if she would be willing to provide him with a telephone number where she can be reached and she refused. She states that she , \"just can't do it at the moment, I just don't trust anyone. I've had too many bad things happen to me - look what happened to the [facility] and my friend in medical records said that people look in peoples charts and call their phones and hang up all the time. Just No.\" I then explained to her that  strives to secure our patients medical records privacy as their number one priority and that security is continually monitored, however she continued to decline.  She was also offered to have her chart listed as confidential and she also refused this option. I then explained to her that it was my job to let Dr. Watts know that she has refused to provide her " "contact phone number and that I did not know what he would do in terms of ordering her wanted tests as he had no way of getting in touch with her. She stated, \"I like things the way they are now. Why can't they just stay the same?\" I told her that I would let Dr. Watts know the details of our conversation, we said our goodbyes and I exited the room. I was called back to room in appx 10 minutes by the patient and she asked me to ask Dr. Watts to please order her the histoplasmosis blood test. Patient was told that Dr. Watts would be made aware of her request, but again, as he has no way of reaching her with results, it was not guaranteed that he would order this.  Patient verbalized understanding.    "

## 2023-12-06 ENCOUNTER — OFFICE VISIT (OUTPATIENT)
Dept: HEMATOLOGY/ONCOLOGY | Facility: CLINIC | Age: 68
End: 2023-12-06
Payer: MEDICARE

## 2023-12-06 VITALS
WEIGHT: 177.47 LBS | TEMPERATURE: 96.1 F | HEART RATE: 99 BPM | DIASTOLIC BLOOD PRESSURE: 103 MMHG | SYSTOLIC BLOOD PRESSURE: 173 MMHG | OXYGEN SATURATION: 97 % | BODY MASS INDEX: 28.56 KG/M2 | RESPIRATION RATE: 20 BRPM

## 2023-12-06 DIAGNOSIS — C50.911 MALIGNANT NEOPLASM OF RIGHT BREAST IN FEMALE, ESTROGEN RECEPTOR POSITIVE, UNSPECIFIED SITE OF BREAST (MULTI): Primary | ICD-10-CM

## 2023-12-06 DIAGNOSIS — R06.02 SHORTNESS OF BREATH: ICD-10-CM

## 2023-12-06 DIAGNOSIS — K29.70 GASTRITIS WITHOUT BLEEDING, UNSPECIFIED CHRONICITY, UNSPECIFIED GASTRITIS TYPE: ICD-10-CM

## 2023-12-06 DIAGNOSIS — Z17.0 MALIGNANT NEOPLASM OF RIGHT BREAST IN FEMALE, ESTROGEN RECEPTOR POSITIVE, UNSPECIFIED SITE OF BREAST (MULTI): Primary | ICD-10-CM

## 2023-12-06 PROCEDURE — 1160F RVW MEDS BY RX/DR IN RCRD: CPT | Performed by: INTERNAL MEDICINE

## 2023-12-06 PROCEDURE — 1036F TOBACCO NON-USER: CPT | Performed by: INTERNAL MEDICINE

## 2023-12-06 PROCEDURE — 3080F DIAST BP >= 90 MM HG: CPT | Performed by: INTERNAL MEDICINE

## 2023-12-06 PROCEDURE — 3077F SYST BP >= 140 MM HG: CPT | Performed by: INTERNAL MEDICINE

## 2023-12-06 PROCEDURE — 99215 OFFICE O/P EST HI 40 MIN: CPT | Performed by: INTERNAL MEDICINE

## 2023-12-06 PROCEDURE — 1126F AMNT PAIN NOTED NONE PRSNT: CPT | Performed by: INTERNAL MEDICINE

## 2023-12-06 PROCEDURE — 1159F MED LIST DOCD IN RCRD: CPT | Performed by: INTERNAL MEDICINE

## 2023-12-06 RX ORDER — SUCRALFATE 1 G/10ML
1 SUSPENSION ORAL
Qty: 1200 ML | Refills: 1 | Status: SHIPPED | OUTPATIENT
Start: 2023-12-06 | End: 2024-02-04

## 2023-12-06 ASSESSMENT — ENCOUNTER SYMPTOMS
OCCASIONAL FEELINGS OF UNSTEADINESS: 0
LOSS OF SENSATION IN FEET: 0
DEPRESSION: 0

## 2023-12-06 ASSESSMENT — PAIN SCALES - GENERAL: PAINLEVEL: 0-NO PAIN

## 2023-12-06 NOTE — PATIENT INSTRUCTIONS
Ginger Richardson, it was a pleasure seeing you today in clinic.    Based on the arm symptoms you are having, and results of the recent ultrasound, I recommend obtaining an updated CT scan to assess the lungs and other parts of the body.    A referral to gastroenterology has been placed.    A prescription for Carafate has been sent to your pharmacy- you can use this as needed for heartburn.

## 2023-12-06 NOTE — PROGRESS NOTES
"Pt seen in office today for a follow up visit with Dr. Watts for management of her breast cancer.  She appears SOB today, POX is 97% on RA.    Per orders, she is to have a CT scan c/a/p without IV contrast  and is to begin on carafate for GI upset. Due to the fact that she refuses to give the medical team her phone number for contact, a FUV cannot be scheduled at th is time to discuss the CT scan results as Dr. Watts will be out on personal leave from 23 through 24.  Patient has requested a copy of the CT scan order because she wants to have this done, \" at an outside, non  hospital\". Pt has been made awae that Dr. Watts will not be able to see these results if it is done at an outside facility. Order has been printed, sealed in an envelope and given to patient, per her request. She has given me the wrapper of an albuterol package and has asked for Dr. Watts to refill this but has asked that he not refill it with\" medication, as it says on this package\". Dr. Watts has been made aware that she is in need of a refill.    Our contact information was given to patient and they were encouraged to contact us with any questions or concerns.     Patient verbalized understanding and agreement regarding discussed information via verbal feedback. Pt escorted to scheduling.   "

## 2023-12-07 DIAGNOSIS — J45.909 ASTHMA, UNSPECIFIED ASTHMA SEVERITY, UNSPECIFIED WHETHER COMPLICATED, UNSPECIFIED WHETHER PERSISTENT (HHS-HCC): Primary | ICD-10-CM

## 2023-12-07 PROBLEM — R06.02 SHORTNESS OF BREATH: Status: ACTIVE | Noted: 2023-12-07

## 2023-12-07 PROBLEM — K29.70 GASTRITIS WITHOUT BLEEDING: Status: ACTIVE | Noted: 2023-12-07

## 2023-12-07 RX ORDER — ALBUTEROL SULFATE 0.63 MG/3ML
0.63 SOLUTION RESPIRATORY (INHALATION) EVERY 6 HOURS PRN
Qty: 75 ML | Refills: 1 | Status: SHIPPED | OUTPATIENT
Start: 2023-12-07 | End: 2024-12-06

## 2023-12-07 NOTE — PROGRESS NOTES
DIVISION OF MEDICAL ONCOLOGY    Patient ID: Debra Sheppard is a 68 y.o. female.  MRN: [id number]  : 1955  The patient presents to clinic today for her history of metastatic breast cancer.    Diagnostic/Therapeutic History:  Diagnosis: Breast cancer, Stage IV (Primary, Right breast overlapping sites, T4c, NX, cM1, G2, ER Status: Positive, GA Status: Negative, HER-2/yue  Status: Positive)-Clinical ).     She noted a right breast mass for several years. This progressively worsened with involvement of the right chest wall, shoulder along with a mass on her back. She had a CT scan of her chest and abdomen that showed multiple hypodense lesions throughout  the liver- borderline left external iliac lymph nodes that are multiple small soft tissue nodules present in the right anterior omentum. -3.1 x 2.5 soft tissue lesion in the subcutaneous tissue of the posterior chest. There is tiny nonspecific sclerotic  lesion in the left femoral neck, right iliac and left acetabulum.  Her CT scan of the chest revealed several nodules in the lung bilaterally. There is also multiple large axillary lymph nodes along with large infiltrative right breast mass involving the entire breast and underlying chest wall. She has a 2.9 soft tissue  nodule in the chest posteriorly to the left.  She was not agreeable to chemotherapy, but to Herceptin and anastrozole.  She was not interested in bisphosphonates or denosumab.    History of Present Illness (HPI)/Interval History:  Debra Sheppard presents today for FUV and discussion of recent ultrasound results.  She has note some shortness of breath recently, which she wonders if is due to her asthma.  She would like a refill on her albuterol. She has come cough. No fevers.  She has been speaking to her friend who is an oncologist, and she worries about how her Herceptin is reconstituted and who transports the medication from pharmacy to the infusion room. She would like information on how this is  done.  No abdominal pain.  She has a lot of mucous and occasional nausea. Heartburn also noted. She would like a referral for an endoscopy.    Review of Systems:  14-point ROS otherwise negative, as per HPI/Interval History.    Past Medical History:   Diagnosis Date    Breast cancer (CMS/HCC)     Hypertension      Patient Active Problem List   Diagnosis    Elevated blood pressure reading in office with diagnosis of hypertension    Essential hypertension    Lymphedema of arm    Malignant neoplasm of unspecified site of right female breast (CMS/HCC)    Nipple anomaly    Vitamin D deficiency    Shortness of breath    Gastritis without bleeding      Social History     Socioeconomic History    Marital status:      Spouse name: None    Number of children: None    Years of education: None    Highest education level: None   Occupational History    None   Tobacco Use    Smoking status: Never     Passive exposure: Never    Smokeless tobacco: Never   Vaping Use    Vaping Use: Never used   Substance and Sexual Activity    Alcohol use: Never    Drug use: Never    Sexual activity: None   Other Topics Concern    None   Social History Narrative    None     Social Determinants of Health     Financial Resource Strain: Not on file   Food Insecurity: Not on file   Transportation Needs: Not on file   Physical Activity: Not on file   Stress: Not on file   Social Connections: Not on file   Intimate Partner Violence: Not on file   Housing Stability: Not on file       Family History   Problem Relation Name Age of Onset    Breast cancer Sister          Triple-negative BC       Allergies:   Allergies   Allergen Reactions    Ciprofloxacin Itching    Latex Unknown    Penicillins Unknown    Surgical Lubricant Jelly Unknown    Adhesive Rash    Sulfa (Sulfonamide Antibiotics) Rash         Current Outpatient Medications:     amLODIPine (Norvasc) 10 mg tablet, Take 1 tablet (10 mg) by mouth once daily., Disp: , Rfl:     anastrozole (Arimidex)  1 mg tablet, Take 1 tablet (1 mg total) by mouth once daily.  Swallow whole with a drink of water., Disp: , Rfl:     camphor-menthoL (Sarna) lotion, Apply topically if needed for itching. Take per directed, Disp: , Rfl:     halobetasol (UltraVATE) 0.05 % ointment, 1 Application every 12 hours. Take per directed, Disp: , Rfl:     sennosides (Senokot) 8.6 mg tablet, Take 1 tablet (8.6 mg) by mouth once daily. Pv Senna-S 60 Ct, Disp: , Rfl:     sucralfate (Carafate) 100 mg/mL suspension, Take 10 mL (1 g) by mouth 4 times a day with meals., Disp: 1200 mL, Rfl: 1    triamterene-hydrochlorothiazid (Maxzide-25) 37.5-25 mg tablet, Take 1 tablet by mouth once daily., Disp: , Rfl:      Objective    BSA: 1.94 meters squared  BP (!) 173/103 (BP Location: Left arm, Patient Position: Sitting, BP Cuff Size: Adult)   Pulse 99   Temp 35.6 °C (96.1 °F) (Temporal)   Resp 20   Wt 80.5 kg (177 lb 7.5 oz)   SpO2 97%   BMI 28.56 kg/m²     ECOG Performance Status:  The ECOG performance scale today is ECO- Restricted in physically strenuous activity.  Carries out light duty.    Physical Exam  Constitutional:       General: She is not in acute distress.     Appearance: Normal appearance. She is not diaphoretic.   HENT:      Head: Normocephalic and atraumatic.   Eyes:      General: No scleral icterus.     Conjunctiva/sclera: Conjunctivae normal.   Cardiovascular:      Rate and Rhythm: Normal rate and regular rhythm.      Heart sounds: No murmur heard.  Pulmonary:      Breath sounds: No stridor. Wheezing and rhonchi (rare) present.      Comments: Shortness of breath noted when talking. Speaks in full sentences.  Musculoskeletal:      Comments: Right hand edema noted. No edema right arm. +Right axillary fullness noted without discrete mass.   Skin:     General: Skin is warm and dry.   Neurological:      General: No focal deficit present.      Mental Status: She is alert and oriented to person, place, and time. Mental status is at  baseline.         Laboratory Data:  Lab Results   Component Value Date    WBC 6.6 08/16/2023    HGB 14.2 08/16/2023    HCT 42.6 08/16/2023    MCV 92.0 08/16/2023     08/16/2023       Chemistry    Lab Results   Component Value Date/Time     08/16/2023 1400    K 3.9 08/16/2023 1400     08/16/2023 1400    CO2 25 08/16/2023 1400    BUN 10 08/16/2023 1400    CREATININE 0.9 08/16/2023 1400    Lab Results   Component Value Date/Time    CALCIUM 9.3 08/16/2023 1400    ALKPHOS 82 08/16/2023 1400    AST 22 08/16/2023 1400    ALT 14 08/16/2023 1400    BILITOT 0.4 08/16/2023 1400        Radiology:  STUDY:  BI US BREAST LIMITED RIGHT; 11/16/2023 8:04 am      INDICATION:  Signs/Symptoms:Right axillary fullness and arm swelling.. Patient has  known breast malignancy with metastatic disease. Recent CT 09/21/2023  demonstrated right breast mass along with right axillary tail mass.      COMPARISON:  Only available comparison study is the CT 09/21/2023 and an  ultrasound of the right breast from 06/13/2022 from [facility].      ACCESSION NUMBER(S):  [id number]      ORDERING CLINICIAN:  LAURENCE TEMPLETON      TECHNIQUE:  Grey scale duplex image of the breast tissue was obtained.      FINDINGS:  Exam was targeted to the patient's right breast mass and right  axillary tail mass.      *Within the right axillary tail there is a solid macrolobulated mass  with marked increased stiffness on elastography without internal  vascularity demonstrated measuring 2.0 x 2.2 x 2.4 cm. On the outside  previous examination the axillary tail mass measuring approximately  1.2 x 1.8 cm. *There is a right breast mass 11 o'clock position with  a distance nipple of 4 cm measuring approximately 1.0 x 1.0 x 1.3 cm  demonstrating spiculated margins with acoustic shadowing and  increased stiffness on elastography. On the previous ultrasound exam  this measured approximately 0.4 x 0.4 x 0.7 cm.      IMPRESSION:  Examination was targeted to the  right breast mass and right axillary  tail mass as detailed above both of which appear to have increased in  size compared to 06/13/2022 ultrasound study. No previous  mammographic imaging is available. You may wish to consider obtaining  bilateral mammography for assessment of the left breast as well as  documentation of lesion size and morphology on the right.    Pathology: N/A      Assessment/Plan:  Debra Sheppard is a 68 y.o. female with a history of metastatic ER+/Her2+ breast cancer, who presents today for follow-up evaluation on anastrozole and trastuzumab (Herceptin).    Malignant neoplasm of unspecified site of right female breast (CMS/HCC)  Stage IV (de adalid), ER+/Her2+, with LN, subcutaneous tissue, bone, lung, liver metastases at diagnosis.    - Right axilla with fullness on exam at last visit. I recommended obtaining a right breast/axillary US for better evaluation, which was performed on 11/16/23. US study (compared to study from 6/2022) demonstrated that breast and axillary mass have increased in size.  - Based on her worsening symptoms, I am concerned for systemic progression. I recommended obtaining a CT CAP for further disease evaluation. She does not wish to receive IV contrast. She would like to have this done at an outside facility. I explained that it is hard for me to see her images, and the radiologist may not be able to compare her images to her most recent CT scans. She is aware of this and would like to proceed with the CT scan at the facility of her choosing. She is not interested in a bone scan.  - Continue anastrozole 1 mg daily for now.  - Additional Herceptin infusions have been scheduled q21 days.  - ECHO with normal LVEF 10/2023. Continue q6 month monitoring, next due 4/2024.    If mild systemic progression noted, would recommend adding pertuzumab (Perjeta) and consider changing anastrozole to exemestane. If significant progression noted, chemotherapy (taxane) with  trastuzumam/pertuzumab would be standard of care.    Shortness of breath  Sp02 97% today. Long-standing history of asthma.  - CT imaging, as above.  - If symptoms worsen, she was instructed to go to the ER.  - She requested a refill of albuterol nebulizing solution, which was sent to her pharmacy.    Gastritis without bleeding  Notes significant acid reflux/burning symptoms. Appetite low. Referral to GI for consideration of endoscopy.     Disposition.  - RTC following CT scan to discuss results. Continue q3 week Herceptin for now.  - She has our contact information and was instructed to call with concerns/questions in the interim.  - We DO NOT have contact information for her, and she is not interested in providing us a phone number to discuss results.    Moe Watts MD  Hematology and Medical Oncology  [facility]

## 2023-12-07 NOTE — ASSESSMENT & PLAN NOTE
Stage IV (de adalid), ER+/Her2+, with LN, subcutaneous tissue, bone, lung, liver metastases at diagnosis.    - Right axilla with fullness on exam at last visit. I recommended obtaining a right breast/axillary US for better evaluation, which was performed on 11/16/23. US study (compared to study from 6/2022) demonstrated that breast and axillary mass have increased in size.  - Based on her worsening symptoms, I am concerned for systemic progression. I recommended obtaining a CT CAP for further disease evaluation. She does not wish to receive IV contrast. She would like to have this done at an outside facility. I explained that it is hard for me to see her images, and the radiologist may not be able to compare her images to her most recent CT scans. She is aware of this and would like to proceed with the CT scan at the facility of her choosing. She is not interested in a bone scan.  - Continue anastrozole 1 mg daily for now.  - Additional Herceptin infusions have been scheduled q21 days.  - ECHO with normal LVEF 10/2023. Continue q6 month monitoring, next due 4/2024.    If mild systemic progression noted, would recommend adding pertuzumab (Perjeta) and consider changing anastrozole to exemestane. If significant progression noted, chemotherapy (taxane) with trastuzumam/pertuzumab would be standard of care.

## 2023-12-07 NOTE — ASSESSMENT & PLAN NOTE
Sp02 97% today. Long-standing history of asthma.  - CT imaging, as above.  - If symptoms worsen, she was instructed to go to the ER.  - She requested a refill of albuterol nebulizing solution, which was sent to her pharmacy.

## 2023-12-07 NOTE — ASSESSMENT & PLAN NOTE
Notes significant acid reflux/burning symptoms. Appetite low. Referral to GI for consideration of endoscopy.

## 2023-12-08 ENCOUNTER — APPOINTMENT (OUTPATIENT)
Dept: HEMATOLOGY/ONCOLOGY | Facility: CLINIC | Age: 68
End: 2023-12-08
Payer: MEDICARE

## 2023-12-22 ENCOUNTER — INFUSION (OUTPATIENT)
Dept: HEMATOLOGY/ONCOLOGY | Facility: CLINIC | Age: 68
End: 2023-12-22
Payer: MEDICARE

## 2023-12-22 VITALS
BODY MASS INDEX: 28.73 KG/M2 | DIASTOLIC BLOOD PRESSURE: 88 MMHG | HEART RATE: 79 BPM | RESPIRATION RATE: 18 BRPM | OXYGEN SATURATION: 97 % | WEIGHT: 178.57 LBS | SYSTOLIC BLOOD PRESSURE: 156 MMHG | TEMPERATURE: 97.5 F

## 2023-12-22 DIAGNOSIS — C50.911 MALIGNANT NEOPLASM OF RIGHT FEMALE BREAST, UNSPECIFIED ESTROGEN RECEPTOR STATUS, UNSPECIFIED SITE OF BREAST (MULTI): ICD-10-CM

## 2023-12-22 DIAGNOSIS — R11.2 CHEMOTHERAPY INDUCED NAUSEA AND VOMITING: ICD-10-CM

## 2023-12-22 DIAGNOSIS — T45.1X5A CHEMOTHERAPY INDUCED NAUSEA AND VOMITING: Primary | ICD-10-CM

## 2023-12-22 DIAGNOSIS — R11.2 CHEMOTHERAPY INDUCED NAUSEA AND VOMITING: Primary | ICD-10-CM

## 2023-12-22 DIAGNOSIS — T45.1X5A CHEMOTHERAPY INDUCED NAUSEA AND VOMITING: ICD-10-CM

## 2023-12-22 PROCEDURE — 2500000004 HC RX 250 GENERAL PHARMACY W/ HCPCS (ALT 636 FOR OP/ED): Performed by: INTERNAL MEDICINE

## 2023-12-22 PROCEDURE — 96413 CHEMO IV INFUSION 1 HR: CPT

## 2023-12-22 RX ORDER — ALBUTEROL SULFATE 0.83 MG/ML
3 SOLUTION RESPIRATORY (INHALATION) AS NEEDED
Status: DISCONTINUED | OUTPATIENT
Start: 2023-12-22 | End: 2023-12-22 | Stop reason: HOSPADM

## 2023-12-22 RX ORDER — FAMOTIDINE 10 MG/ML
20 INJECTION INTRAVENOUS ONCE AS NEEDED
Status: DISCONTINUED | OUTPATIENT
Start: 2023-12-22 | End: 2023-12-22 | Stop reason: HOSPADM

## 2023-12-22 RX ORDER — ONDANSETRON 2 MG/ML
8 INJECTION INTRAMUSCULAR; INTRAVENOUS ONCE
Status: DISCONTINUED | OUTPATIENT
Start: 2023-12-22 | End: 2023-12-22

## 2023-12-22 RX ORDER — PROCHLORPERAZINE EDISYLATE 5 MG/ML
10 INJECTION INTRAMUSCULAR; INTRAVENOUS EVERY 6 HOURS PRN
Status: DISCONTINUED | OUTPATIENT
Start: 2023-12-22 | End: 2023-12-22 | Stop reason: HOSPADM

## 2023-12-22 RX ORDER — ONDANSETRON HYDROCHLORIDE 2 MG/ML
8 INJECTION, SOLUTION INTRAVENOUS ONCE
Status: ACTIVE | OUTPATIENT
Start: 2023-12-22

## 2023-12-22 RX ORDER — DIPHENHYDRAMINE HYDROCHLORIDE 50 MG/ML
50 INJECTION INTRAMUSCULAR; INTRAVENOUS AS NEEDED
Status: DISCONTINUED | OUTPATIENT
Start: 2023-12-22 | End: 2023-12-22 | Stop reason: HOSPADM

## 2023-12-22 RX ORDER — PROCHLORPERAZINE MALEATE 10 MG
10 TABLET ORAL EVERY 6 HOURS PRN
Status: DISCONTINUED | OUTPATIENT
Start: 2023-12-22 | End: 2023-12-22 | Stop reason: HOSPADM

## 2023-12-22 RX ORDER — PANTOPRAZOLE SODIUM 40 MG/1
40 TABLET, DELAYED RELEASE ORAL
COMMUNITY

## 2023-12-22 RX ORDER — EPINEPHRINE 0.3 MG/.3ML
0.3 INJECTION SUBCUTANEOUS EVERY 5 MIN PRN
Status: DISCONTINUED | OUTPATIENT
Start: 2023-12-22 | End: 2023-12-22 | Stop reason: HOSPADM

## 2023-12-22 RX ORDER — ONDANSETRON HYDROCHLORIDE 8 MG/1
8 TABLET, FILM COATED ORAL EVERY 8 HOURS PRN
Qty: 90 TABLET | Refills: 0 | Status: SHIPPED | OUTPATIENT
Start: 2023-12-22

## 2023-12-22 RX ADMIN — TRASTUZUMAB 490.8 MG: 150 INJECTION, POWDER, LYOPHILIZED, FOR SOLUTION INTRAVENOUS at 15:36

## 2023-12-22 ASSESSMENT — PAIN SCALES - GENERAL: PAINLEVEL: 0-NO PAIN

## 2024-01-17 ENCOUNTER — OFFICE VISIT (OUTPATIENT)
Dept: HEMATOLOGY/ONCOLOGY | Facility: CLINIC | Age: 69
End: 2024-01-17
Payer: MEDICARE

## 2024-01-17 VITALS
RESPIRATION RATE: 17 BRPM | SYSTOLIC BLOOD PRESSURE: 194 MMHG | WEIGHT: 175.82 LBS | DIASTOLIC BLOOD PRESSURE: 110 MMHG | TEMPERATURE: 98.2 F | HEART RATE: 103 BPM | BODY MASS INDEX: 28.29 KG/M2 | OXYGEN SATURATION: 92 %

## 2024-01-17 DIAGNOSIS — Z79.899 ENCOUNTER FOR MONITORING CARDIOTOXIC DRUG THERAPY: ICD-10-CM

## 2024-01-17 DIAGNOSIS — R06.02 SHORTNESS OF BREATH: ICD-10-CM

## 2024-01-17 DIAGNOSIS — C50.911 MALIGNANT NEOPLASM OF RIGHT FEMALE BREAST, UNSPECIFIED ESTROGEN RECEPTOR STATUS, UNSPECIFIED SITE OF BREAST (MULTI): ICD-10-CM

## 2024-01-17 DIAGNOSIS — Z17.0 MALIGNANT NEOPLASM OF RIGHT BREAST IN FEMALE, ESTROGEN RECEPTOR POSITIVE, UNSPECIFIED SITE OF BREAST (MULTI): Primary | ICD-10-CM

## 2024-01-17 DIAGNOSIS — C50.911 MALIGNANT NEOPLASM OF RIGHT BREAST IN FEMALE, ESTROGEN RECEPTOR POSITIVE, UNSPECIFIED SITE OF BREAST (MULTI): Primary | ICD-10-CM

## 2024-01-17 DIAGNOSIS — Z51.81 ENCOUNTER FOR MONITORING CARDIOTOXIC DRUG THERAPY: ICD-10-CM

## 2024-01-17 DIAGNOSIS — K29.70 GASTRITIS WITHOUT BLEEDING, UNSPECIFIED CHRONICITY, UNSPECIFIED GASTRITIS TYPE: ICD-10-CM

## 2024-01-17 PROCEDURE — 3077F SYST BP >= 140 MM HG: CPT | Performed by: INTERNAL MEDICINE

## 2024-01-17 PROCEDURE — 3080F DIAST BP >= 90 MM HG: CPT | Performed by: INTERNAL MEDICINE

## 2024-01-17 PROCEDURE — 99215 OFFICE O/P EST HI 40 MIN: CPT | Performed by: INTERNAL MEDICINE

## 2024-01-17 PROCEDURE — 1036F TOBACCO NON-USER: CPT | Performed by: INTERNAL MEDICINE

## 2024-01-17 PROCEDURE — 1159F MED LIST DOCD IN RCRD: CPT | Performed by: INTERNAL MEDICINE

## 2024-01-17 PROCEDURE — 1126F AMNT PAIN NOTED NONE PRSNT: CPT | Performed by: INTERNAL MEDICINE

## 2024-01-17 PROCEDURE — 1160F RVW MEDS BY RX/DR IN RCRD: CPT | Performed by: INTERNAL MEDICINE

## 2024-01-17 RX ORDER — EXEMESTANE 25 MG/1
25 TABLET ORAL DAILY
Qty: 30 TABLET | Refills: 2 | Status: SHIPPED | OUTPATIENT
Start: 2024-01-17 | End: 2024-04-03 | Stop reason: SDUPTHER

## 2024-01-17 ASSESSMENT — PAIN SCALES - GENERAL: PAINLEVEL: 0-NO PAIN

## 2024-01-17 ASSESSMENT — ENCOUNTER SYMPTOMS
OCCASIONAL FEELINGS OF UNSTEADINESS: 0
LOSS OF SENSATION IN FEET: 0
DEPRESSION: 0

## 2024-01-17 ASSESSMENT — PATIENT HEALTH QUESTIONNAIRE - PHQ9
2. FEELING DOWN, DEPRESSED OR HOPELESS: NOT AT ALL
1. LITTLE INTEREST OR PLEASURE IN DOING THINGS: NOT AT ALL
SUM OF ALL RESPONSES TO PHQ9 QUESTIONS 1 AND 2: 0

## 2024-01-17 NOTE — PROGRESS NOTES
"Pt seen in office today for a follow up visit with Dr. Watts  for management of her breast cancer.  She is  without complaints today and denies pain. She states that she missed her treatment appointment last Friday, 1/12/24 because it was not printed out for her and she forgot. I explained to her that we ( clinic) were concerned about her last Friday and had no way of finding out if she was ok. I asked her if she would please give us a contact for emergency purposes and she declined, saying, \" No - that's ok. I don't want to do that.\" She asks if we have her information  from her CT scan from [facility] and we do not. She states that she has brought a copy of the report with her to today's visit. Dr. Watts has been made aware.    Medications, pharmacy preference and allergies were reviewed with patient and updated in the medical record.     Per orders, she is to have her missed treatment appointment rescheduled for Friday, 1/26/24. She is to call me tomorrow for the time of this appointment, as there are no schedulers here in the building at this time. She is also to start on exemestane and will stop her anastrozole. A PI sheet on this has been printed and given to her. She has been advised how important it is that she return my call tomorrow or she won't know what time her appointment is. She has stated that she will. The clinic phone number and my name has been written on her PI sheet.    Our contact information was given to patient and they were encouraged to contact us with any questions or concerns.     Patient verbalized understanding and agreement regarding discussed information via verbal feedback. Pt escorted to scheduling.   " [[email or web address]_OB_FT:[id number]],[[email or web address]_ALL_OB_FT:[id number]],[[email or web address]_ALL_OB_FT:[id number]]

## 2024-01-18 ENCOUNTER — TELEPHONE (OUTPATIENT)
Dept: HEMATOLOGY/ONCOLOGY | Facility: CLINIC | Age: 69
End: 2024-01-18
Payer: MEDICARE

## 2024-01-18 ENCOUNTER — PATIENT MESSAGE (OUTPATIENT)
Dept: HEMATOLOGY/ONCOLOGY | Facility: CLINIC | Age: 69
End: 2024-01-18
Payer: MEDICARE

## 2024-01-18 RX ORDER — ALBUTEROL SULFATE 0.83 MG/ML
3 SOLUTION RESPIRATORY (INHALATION) AS NEEDED
OUTPATIENT
Start: 2024-04-24

## 2024-01-18 RX ORDER — PROCHLORPERAZINE EDISYLATE 5 MG/ML
10 INJECTION INTRAMUSCULAR; INTRAVENOUS EVERY 6 HOURS PRN
OUTPATIENT
Start: 2024-04-24

## 2024-01-18 RX ORDER — FAMOTIDINE 10 MG/ML
20 INJECTION INTRAVENOUS ONCE AS NEEDED
OUTPATIENT
Start: 2024-04-24

## 2024-01-18 RX ORDER — DIPHENHYDRAMINE HYDROCHLORIDE 50 MG/ML
50 INJECTION INTRAMUSCULAR; INTRAVENOUS AS NEEDED
OUTPATIENT
Start: 2024-04-24

## 2024-01-18 RX ORDER — EPINEPHRINE 0.3 MG/.3ML
0.3 INJECTION SUBCUTANEOUS EVERY 5 MIN PRN
OUTPATIENT
Start: 2024-04-24

## 2024-01-18 RX ORDER — PROCHLORPERAZINE MALEATE 10 MG
10 TABLET ORAL EVERY 6 HOURS PRN
OUTPATIENT
Start: 2024-04-24

## 2024-01-18 NOTE — PROGRESS NOTES
Patient came to [facility] at 4:15 pm today and requested to see me in the lobby.She did not have a scheduled appointment today. She had been advised at yesterday's visit with Dr. Watts to call me today to get her infusion appointment next week. She had already stopped at the scheduling desk and had a printed out copy of all of her upcoming appointments - including her infusion appointment for next Friday. She wanted me to confirm that the appointments were correct and they are, in fact, correct.

## 2024-01-19 NOTE — PROGRESS NOTES
DIVISION OF MEDICAL ONCOLOGY    Patient ID: Debra Sheppard is a 68 y.o. female.  MRN: [id number]  : 1955  The patient presents to clinic today for her history of metastatic breast cancer.    Diagnostic/Therapeutic History:  Diagnosis: Breast cancer, Stage IV (Primary, Right breast overlapping sites, T4c, NX, cM1, G2, ER Status: Positive, OR Status: Negative, HER-2/yue  Status: Positive)-Clinical ).  She noted a right breast mass for several years. This progressively worsened with involvement of the right chest wall, shoulder along with a mass on her back. She had a CT scan of her chest and abdomen that showed multiple hypodense lesions throughout  the liver- borderline left external iliac lymph nodes that are multiple small soft tissue nodules present in the right anterior omentum. -3.1 x 2.5 soft tissue lesion in the subcutaneous tissue of the posterior chest. There is tiny nonspecific sclerotic  lesion in the left femoral neck, right iliac and left acetabulum.  Her CT scan of the chest revealed several nodules in the lung bilaterally. There is also multiple large axillary lymph nodes along with large infiltrative right breast mass involving the entire breast and underlying chest wall. She has a 2.9 soft tissue  nodule in the chest posteriorly to the left.  She was not agreeable to chemotherapy, but to Herceptin and anastrozole.  She was not interested in bisphosphonates or denosumab.    History of Present Illness (HPI)/Interval History:  Debra Sheppard presents today for FUV and discussion of recent CT results.  Her breathing has improved since her last visit.  She continues to note phlegm production and issues with swallowing- seeing gastroenterology for a consult next week and possible EGD.  She notes years of right hand tremors- these have remains stable.  No headaches, vision changes, focal weakness.  She inquires about having her Herceptin mixed at a pharmaceutical facility in [place] and shipped here, as  she worries about accuracy in mixing. She feels like the dose changes, as well as the volume. She feels like this increases side effects.  No new breast-related symptoms today. She feels like right breast mass remains soft.    Review of Systems:  14-point ROS otherwise negative, as per HPI/Interval History.    Past Medical History:   Diagnosis Date    Breast cancer (CMS/HCC)     Hypertension      Patient Active Problem List   Diagnosis    Elevated blood pressure reading in office with diagnosis of hypertension    Essential hypertension    Lymphedema of arm    Malignant neoplasm of unspecified site of right female breast (CMS/HCC)    Nipple anomaly    Vitamin D deficiency    Shortness of breath    Gastritis without bleeding      Social History     Socioeconomic History    Marital status:      Spouse name: None    Number of children: None    Years of education: None    Highest education level: None   Occupational History    None   Tobacco Use    Smoking status: Never     Passive exposure: Never    Smokeless tobacco: Never   Vaping Use    Vaping Use: Never used   Substance and Sexual Activity    Alcohol use: Never    Drug use: Never    Sexual activity: None   Other Topics Concern    None   Social History Narrative    None     Social Determinants of Health     Financial Resource Strain: Not on file   Food Insecurity: Not on file   Transportation Needs: Not on file   Physical Activity: Not on file   Stress: Not on file   Social Connections: Not on file   Intimate Partner Violence: Not on file   Housing Stability: Not on file       Family History   Problem Relation Name Age of Onset    Breast cancer Sister          Triple-negative BC       Allergies:   Allergies   Allergen Reactions    Ciprofloxacin Itching    Latex Unknown    Penicillins Unknown    Surgical Lubricant Jelly Unknown    Adhesive Rash    Sulfa (Sulfonamide Antibiotics) Rash         Current Outpatient Medications:     albuterol 0.63 mg/3 mL nebulizer  solution, Take 3 mL (0.63 mg) by nebulization every 6 hours if needed for wheezing or shortness of breath., Disp: 75 mL, Rfl: 1    amLODIPine (Norvasc) 10 mg tablet, Take 1 tablet (10 mg) by mouth once daily., Disp: , Rfl:     halobetasol (UltraVATE) 0.05 % ointment, 1 Application every 12 hours. Take per directed, Disp: , Rfl:     ondansetron (Zofran) 8 mg tablet, Take 1 tablet (8 mg) by mouth every 8 hours if needed for nausea., Disp: 90 tablet, Rfl: 0    sennosides (Senokot) 8.6 mg tablet, Take 1 tablet (8.6 mg) by mouth once daily. Pv Senna-S 60 Ct, Disp: , Rfl:     triamterene-hydrochlorothiazid (Maxzide-25) 37.5-25 mg tablet, Take 1 tablet by mouth once daily., Disp: , Rfl:     camphor-menthoL (Sarna) lotion, Apply topically if needed for itching. Take per directed, Disp: , Rfl:     exemestane (Aromasin) 25 mg tablet, Take 1 tablet (25 mg total) by mouth once daily.  Take after a meal.  Try to take at the same time each day., Disp: 30 tablet, Rfl: 2    pantoprazole (ProtoNix) 40 mg EC tablet, Take 1 tablet (40 mg) by mouth once daily in the morning. Take before meals. Do not crush, chew, or split., Disp: , Rfl:     sucralfate (Carafate) 100 mg/mL suspension, Take 10 mL (1 g) by mouth 4 times a day with meals. (Patient not taking: Reported on 2024), Disp: 1200 mL, Rfl: 1    Current Facility-Administered Medications:     ondansetron (Zofran) injection 8 mg, 8 mg, intravenous, Once, Kendra Zhang PA-C     Objective    BSA: 1.93 meters squared  BP (!) 194/110 (BP Location: Left arm, Patient Position: Sitting, BP Cuff Size: Adult long)   Pulse 103   Temp 36.8 °C (98.2 °F) (Temporal)   Resp 17   Wt 79.8 kg (175 lb 13.1 oz)   SpO2 92%   BMI 28.29 kg/m²     ECOG Performance Status:  The ECOG performance scale today is ECO- Restricted in physically strenuous activity.  Carries out light duty.    Physical Exam  Vitals and nursing note reviewed.   Constitutional:       General: She is not in acute  distress.     Appearance: Normal appearance. She is not ill-appearing.   HENT:      Head: Normocephalic and atraumatic.      Comments: Wearing face mask.  Eyes:      General: No scleral icterus.  Pulmonary:      Effort: Pulmonary effort is normal. No respiratory distress.   Neurological:      Mental Status: She is alert and oriented to person, place, and time. Mental status is at baseline.      Comments: Memory intact.   Psychiatric:         Mood and Affect: Mood normal.         Behavior: Behavior normal.     Remainder of exam deferred today.    Laboratory Data:  Lab Results   Component Value Date    WBC 6.6 08/16/2023    HGB 14.2 08/16/2023    HCT 42.6 08/16/2023    MCV 92.0 08/16/2023     08/16/2023       Chemistry    Lab Results   Component Value Date/Time     08/16/2023 1400    K 3.9 08/16/2023 1400     08/16/2023 1400    CO2 25 08/16/2023 1400    BUN 10 08/16/2023 1400    CREATININE 0.9 08/16/2023 1400    Lab Results   Component Value Date/Time    CALCIUM 9.3 08/16/2023 1400    ALKPHOS 82 08/16/2023 1400    AST 22 08/16/2023 1400    ALT 14 08/16/2023 1400    BILITOT 0.4 08/16/2023 1400        Radiology:  STUDY:  BI US BREAST LIMITED RIGHT; 11/16/2023 8:04 am      INDICATION:  Signs/Symptoms:Right axillary fullness and arm swelling.. Patient has  known breast malignancy with metastatic disease. Recent CT 09/21/2023  demonstrated right breast mass along with right axillary tail mass.      COMPARISON:  Only available comparison study is the CT 09/21/2023 and an  ultrasound of the right breast from 06/13/2022 from [facility].      ACCESSION NUMBER(S):  [id number]      ORDERING CLINICIAN:  LAURENCE TEMPLETON      TECHNIQUE:  Grey scale duplex image of the breast tissue was obtained.      FINDINGS:  Exam was targeted to the patient's right breast mass and right  axillary tail mass.      *Within the right axillary tail there is a solid macrolobulated mass  with marked increased stiffness on  elastography without internal  vascularity demonstrated measuring 2.0 x 2.2 x 2.4 cm. On the outside  previous examination the axillary tail mass measuring approximately  1.2 x 1.8 cm. *There is a right breast mass 11 o'clock position with  a distance nipple of 4 cm measuring approximately 1.0 x 1.0 x 1.3 cm  demonstrating spiculated margins with acoustic shadowing and  increased stiffness on elastography. On the previous ultrasound exam  this measured approximately 0.4 x 0.4 x 0.7 cm.      IMPRESSION:  Examination was targeted to the right breast mass and right axillary  tail mass as detailed above both of which appear to have increased in  size compared to 06/13/2022 ultrasound study. No previous  mammographic imaging is available. You may wish to consider obtaining  bilateral mammography for assessment of the left breast as well as  documentation of lesion size and morphology on the right.    Pathology: N/A      Assessment/Plan:  Debra Sheppard is a 68 y.o. female with a history of metastatic ER+/Her2+ breast cancer, who presents today for follow-up evaluation on anastrozole and trastuzumab (Herceptin).    Malignant neoplasm of unspecified site of right female breast (CMS/HCC)  Stage IV (de adalid), ER+/Her2+, with LN, subcutaneous tissue, bone, lung, liver metastases at diagnosis.    - Based on most recent US results and recent outside CT CAP (w/o contrast), it appears that right breast mass and right axillary LN's have increased in size (mass increased by >1 cm, according to radiology report). No new obvious sites of metastatic disease, although I am only able to compare reports from different institutions, as she is not interested in having her scans done consistently at the same place. We have discussed that this makes comparison of results difficult. I also do not have images to compare myself.  - Based on reports, thankfully no obvious new sites of metastatic disease.  - She is concerned anastrozole is causing  her nausea, despite being on this medication for years. I have recommended changing to exemestane, as this may improve effectiveness with recent CT findings. Ideally, would also add pertuzumab, but will hold for now.  - Continue Herceptin 6 mg/kg q3 weeks for now. I recommend repeat right breast US to assess response- to be ordered at next visit. She is agreeable.  - ECHO with normal LVEF 10/2023. Continue q6 month monitoring, next due 4/2024. To be ordered at next visit,  - Instructed to call with worsening neuro symptoms- need to consider CNS imaging for tremors, but would like to wait for now. These are at least reportedly stable.  - We can likely not accommodate her request to have Herceptin mixed at an outside facility. Will discuss with pharmacy and clinic leadership.    Shortness of breath  Improved. Long-standing history of asthma.  Uses albuterol nebulizers at home.    Gastritis without bleeding  Notes significant acid reflux/burning symptoms. Seeing GI next week.     Disposition.  - Herceptin q3 weeks. FUV 6-9 weeks.  - She has our contact information and was instructed to call with concerns/questions in the interim.  - We DO NOT have contact information for her, and she is not interested in providing us a phone number to discuss results (discussed today).    Moe Watts MD  Hematology and Medical Oncology  [facility]

## 2024-01-20 NOTE — ASSESSMENT & PLAN NOTE
Stage IV (de adalid), ER+/Her2+, with LN, subcutaneous tissue, bone, lung, liver metastases at diagnosis.    - Based on most recent US results and recent outside CT CAP (w/o contrast), it appears that right breast mass and right axillary LN's have increased in size (mass increased by >1 cm, according to radiology report). No new obvious sites of metastatic disease, although I am only able to compare reports from different institutions, as she is not interested in having her scans done consistently at the same place. We have discussed that this makes comparison of results difficult. I also do not have images to compare myself.  - Based on reports, thankfully no obvious new sites of metastatic disease.  - She is concerned anastrozole is causing her nausea, despite being on this medication for years. I have recommended changing to exemestane, as this may improve effectiveness with recent CT findings. Ideally, would also add pertuzumab, but will hold for now.  - Continue Herceptin 6 mg/kg q3 weeks for now. I recommend repeat right breast US to assess response- to be ordered at next visit. She is agreeable.  - ECHO with normal LVEF 10/2023. Continue q6 month monitoring, next due 4/2024. To be ordered at next visit,  - Instructed to call with worsening neuro symptoms- need to consider CNS imaging for tremors, but would like to wait for now. These are at least reportedly stable.  - We can likely not accommodate her request to have Herceptin mixed at an outside facility. Will discuss with pharmacy and clinic leadership.

## 2024-01-26 ENCOUNTER — TELEPHONE (OUTPATIENT)
Dept: HEMATOLOGY/ONCOLOGY | Facility: CLINIC | Age: 69
End: 2024-01-26

## 2024-01-30 ENCOUNTER — INFUSION (OUTPATIENT)
Dept: HEMATOLOGY/ONCOLOGY | Facility: CLINIC | Age: 69
End: 2024-01-30
Payer: MEDICARE

## 2024-01-30 VITALS
WEIGHT: 170.86 LBS | SYSTOLIC BLOOD PRESSURE: 149 MMHG | BODY MASS INDEX: 27.49 KG/M2 | OXYGEN SATURATION: 99 % | RESPIRATION RATE: 18 BRPM | TEMPERATURE: 97 F | HEART RATE: 94 BPM | DIASTOLIC BLOOD PRESSURE: 101 MMHG

## 2024-01-30 DIAGNOSIS — C50.911 MALIGNANT NEOPLASM OF RIGHT BREAST IN FEMALE, ESTROGEN RECEPTOR POSITIVE, UNSPECIFIED SITE OF BREAST (MULTI): Primary | ICD-10-CM

## 2024-01-30 DIAGNOSIS — R82.998 DARK URINE: ICD-10-CM

## 2024-01-30 DIAGNOSIS — R82.998 DARK URINE: Primary | ICD-10-CM

## 2024-01-30 DIAGNOSIS — C50.911 MALIGNANT NEOPLASM OF RIGHT FEMALE BREAST, UNSPECIFIED ESTROGEN RECEPTOR STATUS, UNSPECIFIED SITE OF BREAST (MULTI): ICD-10-CM

## 2024-01-30 DIAGNOSIS — Z17.0 MALIGNANT NEOPLASM OF RIGHT BREAST IN FEMALE, ESTROGEN RECEPTOR POSITIVE, UNSPECIFIED SITE OF BREAST (MULTI): Primary | ICD-10-CM

## 2024-01-30 PROCEDURE — 2500000004 HC RX 250 GENERAL PHARMACY W/ HCPCS (ALT 636 FOR OP/ED): Mod: JG | Performed by: INTERNAL MEDICINE

## 2024-01-30 PROCEDURE — 96413 CHEMO IV INFUSION 1 HR: CPT

## 2024-01-30 RX ORDER — DIPHENHYDRAMINE HYDROCHLORIDE 50 MG/ML
50 INJECTION INTRAMUSCULAR; INTRAVENOUS AS NEEDED
Status: DISCONTINUED | OUTPATIENT
Start: 2024-01-30 | End: 2024-01-30 | Stop reason: HOSPADM

## 2024-01-30 RX ORDER — EPINEPHRINE 0.3 MG/.3ML
0.3 INJECTION SUBCUTANEOUS EVERY 5 MIN PRN
Status: DISCONTINUED | OUTPATIENT
Start: 2024-01-30 | End: 2024-01-30 | Stop reason: HOSPADM

## 2024-01-30 RX ORDER — HEPARIN SODIUM,PORCINE/PF 10 UNIT/ML
50 SYRINGE (ML) INTRAVENOUS AS NEEDED
Status: CANCELLED | OUTPATIENT
Start: 2024-01-30

## 2024-01-30 RX ORDER — PROCHLORPERAZINE EDISYLATE 5 MG/ML
10 INJECTION INTRAMUSCULAR; INTRAVENOUS EVERY 6 HOURS PRN
Status: DISCONTINUED | OUTPATIENT
Start: 2024-01-30 | End: 2024-01-30 | Stop reason: HOSPADM

## 2024-01-30 RX ORDER — HEPARIN SODIUM,PORCINE/PF 10 UNIT/ML
50 SYRINGE (ML) INTRAVENOUS AS NEEDED
Status: DISCONTINUED | OUTPATIENT
Start: 2024-01-30 | End: 2024-01-30 | Stop reason: HOSPADM

## 2024-01-30 RX ORDER — HEPARIN 100 UNIT/ML
500 SYRINGE INTRAVENOUS AS NEEDED
Status: CANCELLED | OUTPATIENT
Start: 2024-01-30

## 2024-01-30 RX ORDER — FAMOTIDINE 10 MG/ML
20 INJECTION INTRAVENOUS ONCE AS NEEDED
Status: DISCONTINUED | OUTPATIENT
Start: 2024-01-30 | End: 2024-01-30 | Stop reason: HOSPADM

## 2024-01-30 RX ORDER — PROCHLORPERAZINE MALEATE 10 MG
10 TABLET ORAL EVERY 6 HOURS PRN
Status: DISCONTINUED | OUTPATIENT
Start: 2024-01-30 | End: 2024-01-30 | Stop reason: HOSPADM

## 2024-01-30 RX ORDER — HEPARIN 100 UNIT/ML
500 SYRINGE INTRAVENOUS AS NEEDED
Status: DISCONTINUED | OUTPATIENT
Start: 2024-01-30 | End: 2024-01-30 | Stop reason: HOSPADM

## 2024-01-30 RX ORDER — ALBUTEROL SULFATE 0.83 MG/ML
3 SOLUTION RESPIRATORY (INHALATION) AS NEEDED
Status: DISCONTINUED | OUTPATIENT
Start: 2024-01-30 | End: 2024-01-30 | Stop reason: HOSPADM

## 2024-01-30 RX ADMIN — TRASTUZUMAB 478.8 MG: 150 INJECTION, POWDER, LYOPHILIZED, FOR SOLUTION INTRAVENOUS at 14:31

## 2024-01-30 ASSESSMENT — PAIN SCALES - GENERAL: PAINLEVEL: 0-NO PAIN

## 2024-01-30 NOTE — PROGRESS NOTES
Dr. Watts ordered UA w/ reflex. Pt unable to leave urine sample @ visit. I gave pt container to collect urine and advised her to drop off at any  lab (put in refrigerator if she cannot get to the lab within an hour of collection) Pt refuses to give contact information ie; phone number for us to contact her with results. Pt states she will come to [place] either Friday 2/2 or Monday 2/5 to speak with nurse partner regarding urine results.  Juani Encarnacion RN

## 2024-01-31 ENCOUNTER — LAB (OUTPATIENT)
Dept: LAB | Facility: LAB | Age: 69
End: 2024-01-31
Payer: MEDICARE

## 2024-01-31 LAB
APPEARANCE UR: CLEAR
BILIRUB UR STRIP.AUTO-MCNC: NEGATIVE MG/DL
COLOR UR: YELLOW
GLUCOSE UR STRIP.AUTO-MCNC: NEGATIVE MG/DL
HYALINE CASTS #/AREA URNS AUTO: ABNORMAL /LPF
KETONES UR STRIP.AUTO-MCNC: ABNORMAL MG/DL
LEUKOCYTE ESTERASE UR QL STRIP.AUTO: ABNORMAL
MUCOUS THREADS #/AREA URNS AUTO: ABNORMAL /LPF
NITRITE UR QL STRIP.AUTO: NEGATIVE
PH UR STRIP.AUTO: 5 [PH]
PROT UR STRIP.AUTO-MCNC: ABNORMAL MG/DL
RBC # UR STRIP.AUTO: NEGATIVE /UL
RBC #/AREA URNS AUTO: ABNORMAL /HPF
SP GR UR STRIP.AUTO: 1.02
SQUAMOUS #/AREA URNS AUTO: ABNORMAL /HPF
UROBILINOGEN UR STRIP.AUTO-MCNC: 4 MG/DL
WBC #/AREA URNS AUTO: ABNORMAL /HPF

## 2024-01-31 PROCEDURE — 87186 SC STD MICRODIL/AGAR DIL: CPT

## 2024-01-31 PROCEDURE — 87086 URINE CULTURE/COLONY COUNT: CPT

## 2024-01-31 PROCEDURE — 81001 URINALYSIS AUTO W/SCOPE: CPT

## 2024-02-01 LAB — HOLD SPECIMEN: NORMAL

## 2024-02-02 ENCOUNTER — APPOINTMENT (OUTPATIENT)
Dept: HEMATOLOGY/ONCOLOGY | Facility: CLINIC | Age: 69
End: 2024-02-02
Payer: MEDICARE

## 2024-02-02 LAB — BACTERIA UR CULT: ABNORMAL

## 2024-02-03 ENCOUNTER — TELEPHONE (OUTPATIENT)
Dept: HEMATOLOGY/ONCOLOGY | Facility: CLINIC | Age: 69
End: 2024-02-03
Payer: MEDICARE

## 2024-02-03 DIAGNOSIS — N30.00 ACUTE CYSTITIS WITHOUT HEMATURIA: Primary | ICD-10-CM

## 2024-02-03 RX ORDER — GRANULES FOR ORAL 3 G/1
3 POWDER ORAL ONCE
Qty: 3 G | Refills: 0 | Status: SHIPPED | OUTPATIENT
Start: 2024-02-03 | End: 2024-02-03

## 2024-02-03 NOTE — TELEPHONE ENCOUNTER
Patient complained of dark urine at recent Herceptin infusion.  UA/Ucx obtained.  As we told her at the infusion visit, there is no way to communicate results to her, as she does not want to provide contact information.  She was told to come to clinic on Friday (2/2/24) or Monday (2/5/24) with results.  She did not come on 2/2, will await her next week, but will call in an antibiotic to her pharmacy.  Fosfomycin chosen due to allergies.  Lab orders were also placed, so hopefully she has those done, too. She prefers to get those done at another facility.    Component      Latest Ref Rng 1/31/2024   Color, Urine      Straw, Yellow  Yellow    Appearance, Urine      Clear  Clear    Specific Gravity, Urine      1.005 - 1.035  1.025    pH, Urine      5.0, 5.5, 6.0, 6.5, 7.0, 7.5, 8.0  5.0    Protein, Urine      NEGATIVE mg/dL 30 (1+) ! (N)    Glucose, Urine      NEGATIVE mg/dL NEGATIVE    Blood, Urine      NEGATIVE  NEGATIVE    Ketones, Urine      NEGATIVE mg/dL 5 (TRACE) !    Bilirubin, Urine      NEGATIVE  NEGATIVE    Urobilinogen, Urine      <2.0 mg/dL 4.0 ! (N)    Nitrite, Urine      NEGATIVE  NEGATIVE    Leukocyte Esterase, Urine      NEGATIVE  MODERATE (2+) !    WBC, Urine      1-5, NONE /HPF 11-20 !    RBC, Urine      NONE, 1-2, 3-5 /HPF 6-10 !    Squamous Epithelial Cells, Urine      Reference range not established. /HPF 1-9 (SPARSE)    Mucus, Urine      Reference range not established. /LPF 3+    Hyaline Casts, Urine      NONE /LPF 1+ !    Extra Tube Hold for add-ons.       Legend:  ! (N) Normal  ! Abnormal    Contains abnormal data Urine Culture  Order: [id number] - Reflex for Order [id number]  Collected 1/31/2024 15:08       Status: Final result       Visible to patient: No [facility]       Dx: Dark urine    Specimen Information: Clean Catch/Voided; Urine   0 Result Notes  Urine Culture 20,000 - 80,000 Pseudomonas aeruginosa Abnormal            Resulting Agency: [facility]     Susceptibility      Pseudomonas aeruginosa     MICROSCAN    $ Aztreonam Susceptible    $$ Cefepime Susceptible    $$ Ceftazidime Susceptible    $ Ciprofloxacin Susceptible    $ Gentamicin Susceptible    $$ Piperacillin/Tazobactam Susceptible    $ Tobramycin Susceptible

## 2024-02-06 ENCOUNTER — LAB (OUTPATIENT)
Dept: LAB | Facility: LAB | Age: 69
End: 2024-02-06
Payer: MEDICARE

## 2024-02-06 DIAGNOSIS — C50.911 MALIGNANT NEOPLASM OF RIGHT BREAST IN FEMALE, ESTROGEN RECEPTOR POSITIVE, UNSPECIFIED SITE OF BREAST (MULTI): ICD-10-CM

## 2024-02-06 DIAGNOSIS — Z17.0 MALIGNANT NEOPLASM OF RIGHT BREAST IN FEMALE, ESTROGEN RECEPTOR POSITIVE, UNSPECIFIED SITE OF BREAST (MULTI): ICD-10-CM

## 2024-02-06 LAB
ALBUMIN SERPL BCP-MCNC: 3.9 G/DL (ref 3.4–5)
ALP SERPL-CCNC: 74 U/L (ref 33–136)
ALT SERPL W P-5'-P-CCNC: 20 U/L (ref 7–45)
ANION GAP SERPL CALC-SCNC: 22 MMOL/L (ref 10–20)
AST SERPL W P-5'-P-CCNC: 17 U/L (ref 9–39)
BASOPHILS # BLD AUTO: 0.02 X10*3/UL (ref 0–0.1)
BASOPHILS NFR BLD AUTO: 0.3 %
BILIRUB SERPL-MCNC: 0.5 MG/DL (ref 0–1.2)
BUN SERPL-MCNC: 6 MG/DL (ref 6–23)
CALCIUM SERPL-MCNC: 9.4 MG/DL (ref 8.6–10.3)
CHLORIDE SERPL-SCNC: 97 MMOL/L (ref 98–107)
CO2 SERPL-SCNC: 30 MMOL/L (ref 21–32)
CREAT SERPL-MCNC: 1.13 MG/DL (ref 0.5–1.05)
EGFRCR SERPLBLD CKD-EPI 2021: 53 ML/MIN/1.73M*2
EOSINOPHIL # BLD AUTO: 0.21 X10*3/UL (ref 0–0.7)
EOSINOPHIL NFR BLD AUTO: 3.5 %
ERYTHROCYTE [DISTWIDTH] IN BLOOD BY AUTOMATED COUNT: 12.6 % (ref 11.5–14.5)
GLUCOSE SERPL-MCNC: 100 MG/DL (ref 74–99)
HCT VFR BLD AUTO: 42.8 % (ref 36–46)
HGB BLD-MCNC: 14.1 G/DL (ref 12–16)
IMM GRANULOCYTES # BLD AUTO: 0.02 X10*3/UL (ref 0–0.7)
IMM GRANULOCYTES NFR BLD AUTO: 0.3 % (ref 0–0.9)
LYMPHOCYTES # BLD AUTO: 1.26 X10*3/UL (ref 1.2–4.8)
LYMPHOCYTES NFR BLD AUTO: 20.8 %
MCH RBC QN AUTO: 30.3 PG (ref 26–34)
MCHC RBC AUTO-ENTMCNC: 32.9 G/DL (ref 32–36)
MCV RBC AUTO: 92 FL (ref 80–100)
MONOCYTES # BLD AUTO: 0.41 X10*3/UL (ref 0.1–1)
MONOCYTES NFR BLD AUTO: 6.8 %
NEUTROPHILS # BLD AUTO: 4.14 X10*3/UL (ref 1.2–7.7)
NEUTROPHILS NFR BLD AUTO: 68.3 %
NRBC BLD-RTO: 0 /100 WBCS (ref 0–0)
PLATELET # BLD AUTO: 317 X10*3/UL (ref 150–450)
POTASSIUM SERPL-SCNC: 3 MMOL/L (ref 3.5–5.3)
PROT SERPL-MCNC: 7.2 G/DL (ref 6.4–8.2)
RBC # BLD AUTO: 4.66 X10*6/UL (ref 4–5.2)
SODIUM SERPL-SCNC: 146 MMOL/L (ref 136–145)
WBC # BLD AUTO: 6.1 X10*3/UL (ref 4.4–11.3)

## 2024-02-06 PROCEDURE — 86300 IMMUNOASSAY TUMOR CA 15-3: CPT

## 2024-02-06 PROCEDURE — 85025 COMPLETE CBC W/AUTO DIFF WBC: CPT

## 2024-02-06 PROCEDURE — 80053 COMPREHEN METABOLIC PANEL: CPT

## 2024-02-06 PROCEDURE — 36415 COLL VENOUS BLD VENIPUNCTURE: CPT

## 2024-02-07 LAB — CANCER AG27-29 SERPL-ACNC: 19 U/ML (ref 0–38.6)

## 2024-02-16 ENCOUNTER — APPOINTMENT (OUTPATIENT)
Dept: HEMATOLOGY/ONCOLOGY | Facility: CLINIC | Age: 69
End: 2024-02-16
Payer: MEDICARE

## 2024-02-20 ENCOUNTER — INFUSION (OUTPATIENT)
Dept: HEMATOLOGY/ONCOLOGY | Facility: CLINIC | Age: 69
End: 2024-02-20
Payer: MEDICARE

## 2024-02-20 ENCOUNTER — DOCUMENTATION (OUTPATIENT)
Dept: HEMATOLOGY/ONCOLOGY | Facility: CLINIC | Age: 69
End: 2024-02-20
Payer: MEDICARE

## 2024-02-20 VITALS
BODY MASS INDEX: 26.61 KG/M2 | OXYGEN SATURATION: 98 % | SYSTOLIC BLOOD PRESSURE: 158 MMHG | WEIGHT: 165.34 LBS | RESPIRATION RATE: 18 BRPM | TEMPERATURE: 97.3 F | DIASTOLIC BLOOD PRESSURE: 99 MMHG | HEART RATE: 88 BPM

## 2024-02-20 DIAGNOSIS — C50.911 MALIGNANT NEOPLASM OF RIGHT FEMALE BREAST, UNSPECIFIED ESTROGEN RECEPTOR STATUS, UNSPECIFIED SITE OF BREAST (MULTI): ICD-10-CM

## 2024-02-20 PROCEDURE — 2500000004 HC RX 250 GENERAL PHARMACY W/ HCPCS (ALT 636 FOR OP/ED): Performed by: INTERNAL MEDICINE

## 2024-02-20 PROCEDURE — 96413 CHEMO IV INFUSION 1 HR: CPT

## 2024-02-20 RX ORDER — HEPARIN SODIUM,PORCINE/PF 10 UNIT/ML
50 SYRINGE (ML) INTRAVENOUS AS NEEDED
Status: CANCELLED | OUTPATIENT
Start: 2024-02-20

## 2024-02-20 RX ORDER — ALBUTEROL SULFATE 0.83 MG/ML
3 SOLUTION RESPIRATORY (INHALATION) AS NEEDED
Status: DISCONTINUED | OUTPATIENT
Start: 2024-02-20 | End: 2024-02-20 | Stop reason: HOSPADM

## 2024-02-20 RX ORDER — PROCHLORPERAZINE EDISYLATE 5 MG/ML
10 INJECTION INTRAMUSCULAR; INTRAVENOUS EVERY 6 HOURS PRN
Status: DISCONTINUED | OUTPATIENT
Start: 2024-02-20 | End: 2024-02-20 | Stop reason: HOSPADM

## 2024-02-20 RX ORDER — PROCHLORPERAZINE MALEATE 10 MG
10 TABLET ORAL EVERY 6 HOURS PRN
Status: DISCONTINUED | OUTPATIENT
Start: 2024-02-20 | End: 2024-02-20 | Stop reason: HOSPADM

## 2024-02-20 RX ORDER — FAMOTIDINE 10 MG/ML
20 INJECTION INTRAVENOUS ONCE AS NEEDED
Status: DISCONTINUED | OUTPATIENT
Start: 2024-02-20 | End: 2024-02-20 | Stop reason: HOSPADM

## 2024-02-20 RX ORDER — EPINEPHRINE 0.3 MG/.3ML
0.3 INJECTION SUBCUTANEOUS EVERY 5 MIN PRN
Status: DISCONTINUED | OUTPATIENT
Start: 2024-02-20 | End: 2024-02-20 | Stop reason: HOSPADM

## 2024-02-20 RX ORDER — HEPARIN 100 UNIT/ML
500 SYRINGE INTRAVENOUS AS NEEDED
Status: CANCELLED | OUTPATIENT
Start: 2024-02-20

## 2024-02-20 RX ORDER — DIPHENHYDRAMINE HYDROCHLORIDE 50 MG/ML
50 INJECTION INTRAMUSCULAR; INTRAVENOUS AS NEEDED
Status: DISCONTINUED | OUTPATIENT
Start: 2024-02-20 | End: 2024-02-20 | Stop reason: HOSPADM

## 2024-02-20 RX ADMIN — TRASTUZUMAB 478.8 MG: 150 INJECTION, POWDER, LYOPHILIZED, FOR SOLUTION INTRAVENOUS at 10:59

## 2024-02-20 ASSESSMENT — PAIN SCALES - GENERAL: PAINLEVEL: 0-NO PAIN

## 2024-02-20 NOTE — PROGRESS NOTES
Spent 40 minutes with DANIEL today at the beginning of infusion appointment discussing recent patient requests that EDNA Peters nor Dr. Watts can nadeem at this time. DANIEL was verbally educated and reminded of current tx. Medication dose, route, and frequency. In addition to safe administration and monitoring.  She verbally stated she understood and agreed to accept medication dose as ordered. During this time, we also discussed the importance of providing contact information. She continues to refuse.

## 2024-02-22 ENCOUNTER — APPOINTMENT (OUTPATIENT)
Dept: HEMATOLOGY/ONCOLOGY | Facility: CLINIC | Age: 69
End: 2024-02-22
Payer: MEDICARE

## 2024-02-23 ENCOUNTER — APPOINTMENT (OUTPATIENT)
Dept: HEMATOLOGY/ONCOLOGY | Facility: CLINIC | Age: 69
End: 2024-02-23
Payer: MEDICARE

## 2024-03-07 ENCOUNTER — APPOINTMENT (OUTPATIENT)
Dept: HEMATOLOGY/ONCOLOGY | Facility: CLINIC | Age: 69
End: 2024-03-07
Payer: MEDICARE

## 2024-03-08 ENCOUNTER — APPOINTMENT (OUTPATIENT)
Dept: HEMATOLOGY/ONCOLOGY | Facility: CLINIC | Age: 69
End: 2024-03-08
Payer: MEDICARE

## 2024-03-12 ENCOUNTER — APPOINTMENT (OUTPATIENT)
Dept: HEMATOLOGY/ONCOLOGY | Facility: CLINIC | Age: 69
End: 2024-03-12
Payer: MEDICARE

## 2024-03-12 ENCOUNTER — OFFICE VISIT (OUTPATIENT)
Dept: HEMATOLOGY/ONCOLOGY | Facility: CLINIC | Age: 69
End: 2024-03-12
Payer: MEDICARE

## 2024-03-12 VITALS
DIASTOLIC BLOOD PRESSURE: 108 MMHG | SYSTOLIC BLOOD PRESSURE: 160 MMHG | HEART RATE: 88 BPM | OXYGEN SATURATION: 94 % | RESPIRATION RATE: 20 BRPM | TEMPERATURE: 96.4 F | BODY MASS INDEX: 26.21 KG/M2 | WEIGHT: 162.92 LBS

## 2024-03-12 DIAGNOSIS — C50.911 MALIGNANT NEOPLASM OF RIGHT FEMALE BREAST, UNSPECIFIED ESTROGEN RECEPTOR STATUS, UNSPECIFIED SITE OF BREAST (MULTI): ICD-10-CM

## 2024-03-12 DIAGNOSIS — R42 VERTIGO: Primary | ICD-10-CM

## 2024-03-12 DIAGNOSIS — R35.0 URINARY FREQUENCY: ICD-10-CM

## 2024-03-12 DIAGNOSIS — R31.9 HEMATURIA, UNSPECIFIED TYPE: ICD-10-CM

## 2024-03-12 PROCEDURE — 3077F SYST BP >= 140 MM HG: CPT | Performed by: NURSE PRACTITIONER

## 2024-03-12 PROCEDURE — 1036F TOBACCO NON-USER: CPT | Performed by: NURSE PRACTITIONER

## 2024-03-12 PROCEDURE — 1159F MED LIST DOCD IN RCRD: CPT | Performed by: NURSE PRACTITIONER

## 2024-03-12 PROCEDURE — 3080F DIAST BP >= 90 MM HG: CPT | Performed by: NURSE PRACTITIONER

## 2024-03-12 PROCEDURE — 1126F AMNT PAIN NOTED NONE PRSNT: CPT | Performed by: NURSE PRACTITIONER

## 2024-03-12 PROCEDURE — 99215 OFFICE O/P EST HI 40 MIN: CPT | Performed by: NURSE PRACTITIONER

## 2024-03-12 PROCEDURE — 1157F ADVNC CARE PLAN IN RCRD: CPT | Performed by: NURSE PRACTITIONER

## 2024-03-12 RX ORDER — MECLIZINE HYDROCHLORIDE 25 MG/1
25 TABLET ORAL DAILY
Qty: 10 TABLET | Refills: 0 | Status: SHIPPED | OUTPATIENT
Start: 2024-03-12 | End: 2024-03-22

## 2024-03-12 ASSESSMENT — PAIN SCALES - GENERAL: PAINLEVEL: 0-NO PAIN

## 2024-03-12 NOTE — PROGRESS NOTES
Pt presents to clinic for follow up with Jeff Delgado for management of (R)Breast CA.    Pt states she is feeling nausea today, and hasn't been eating well the last few days related to GI upset. Pt additionally complains of feeling unsteady on her feet. BP today 160/108.    Pharmacy location current on file. Allergies updated. Medications reconciled.     Per orders, pt will continue on Herceptin q 3 weeks and will have a FUV in 6-9 weeks. No treatment today. US of breast to be scheduled, 2 week FUV with Dr. Watts, pt provided with urine cup to provide sample at home and take to any walk-in pt lab.     Patient verbalizes understanding of the above instructions with no further questions or concerns at this time.

## 2024-03-12 NOTE — PROGRESS NOTES
DIVISION OF MEDICAL ONCOLOGY    Patient ID: Debra Sheppard is a 68 y.o. female.  MRN: [id number]  : 1955  The patient presents to clinic today for her history of metastatic breast cancer.    Diagnostic/Therapeutic History:  Diagnosis: Breast cancer, Stage IV (Primary, Right breast overlapping sites, T4c, NX, cM1, G2, ER Status: Positive, ND Status: Negative, HER-2/yue  Status: Positive)-Clinical ).  She noted a right breast mass for several years. This progressively worsened with involvement of the right chest wall, shoulder along with a mass on her back. She had a CT scan of her chest and abdomen that showed multiple hypodense lesions throughout  the liver- borderline left external iliac lymph nodes that are multiple small soft tissue nodules present in the right anterior omentum. -3.1 x 2.5 soft tissue lesion in the subcutaneous tissue of the posterior chest. There is tiny nonspecific sclerotic  lesion in the left femoral neck, right iliac and left acetabulum.  Her CT scan of the chest revealed several nodules in the lung bilaterally. There is also multiple large axillary lymph nodes along with large infiltrative right breast mass involving the entire breast and underlying chest wall. She has a 2.9 soft tissue  nodule in the chest posteriorly to the left.  She was not agreeable to chemotherapy, but to Herceptin and anastrozole.  She was not interested in bisphosphonates or denosumab.    History of Present Illness (HPI)/Interval History:  Debra Sheppard presents today for FUV and discussion of recent CT results.  Her breathing has improved since her last visit.  She continues to note phlegm production and issues with swallowing- seeing gastroenterology for a consult next week and possible EGD.  She notes years of right hand tremors- these have remains stable.  No headaches, vision changes, focal weakness.  She inquires about having her Herceptin mixed at a pharmaceutical facility in [place] and shipped here, as  she worries about accuracy in mixing. She feels like the dose changes, as well as the volume. She feels like this increases side effects.  No new breast-related symptoms today. She feels like right breast mass remains soft.    March 12, 2024  Patient presents today for follow-up evaluation.  She looks fatigued and ill on arrival.  Voice is hoarse she reports she has seen ENT there is concern something is pressing on vocal cord.  She reports she was also told there is something in her right ear.  She was assessed by ENT for vertigo as well as increased mucus production.  She reports ongoing difficulty with vertigo and she is very unsteady on her feet today difficulty moving from sitting to standing patient also veers to the right when walking.  This is a new symptom for her.  She reports vertigo is unchanged but difficulty in walking is a newer symptom.  She reports she did have a scan of her head completed at either [facility] or [place] I am unable to access image.  In regard to mucus production she reports this is improved however is carrying a container to spit phlegm into.  It is clear.  She denies any fevers, chills or night sweats.  No signs or symptoms of infection per her report.  Intermittent nausea she reports since starting exemestane was unclear during our conversation whether or not she was still taking it seems as though she takes it with decreased oral intake and is able to tolerate it well.  Denies constipation.  No difficulty with urination believes UTI symptoms have improved although she is not certain, reports feeling antibiotic (Fosfomycin) therefore unable to take complete dose.  She is again hypertensive today.    Past Medical History:   Diagnosis Date    Breast cancer (CMS/[facility])     Hypertension      Patient Active Problem List   Diagnosis    Elevated blood pressure reading in office with diagnosis of hypertension    Essential hypertension    Lymphedema of arm    Malignant neoplasm of  unspecified site of right female breast (CMS/HCC)    Nipple anomaly    Vitamin D deficiency    Shortness of breath    Gastritis without bleeding      Social History     Socioeconomic History    Marital status:      Spouse name: None    Number of children: None    Years of education: None    Highest education level: None   Occupational History    None   Tobacco Use    Smoking status: Never     Passive exposure: Never    Smokeless tobacco: Never   Vaping Use    Vaping Use: Never used   Substance and Sexual Activity    Alcohol use: Never    Drug use: Never    Sexual activity: None   Other Topics Concern    None   Social History Narrative    None     Social Determinants of Health     Financial Resource Strain: Not on file   Food Insecurity: Not on file   Transportation Needs: Not on file   Physical Activity: Not on file   Stress: Not on file   Social Connections: Not on file   Intimate Partner Violence: Not on file   Housing Stability: Not on file       Family History   Problem Relation Name Age of Onset    Breast cancer Sister          Triple-negative BC       Allergies:   Allergies   Allergen Reactions    Ciprofloxacin Itching    Latex Unknown    Penicillins Unknown    Surgical Lubricant Jelly Unknown    Adhesive Rash    Sulfa (Sulfonamide Antibiotics) Rash     Current Outpatient Medications on File Prior to Visit   Medication Sig Dispense Refill    albuterol 0.63 mg/3 mL nebulizer solution Take 3 mL (0.63 mg) by nebulization every 6 hours if needed for wheezing or shortness of breath. 75 mL 1    amLODIPine (Norvasc) 10 mg tablet Take 1 tablet (10 mg) by mouth once daily.      exemestane (Aromasin) 25 mg tablet Take 1 tablet (25 mg total) by mouth once daily.  Take after a meal.  Try to take at the same time each day. 30 tablet 2    pantoprazole (ProtoNix) 40 mg EC tablet Take 1 tablet (40 mg) by mouth once daily in the morning. Take before meals. Do not crush, chew, or split.       triamterene-hydrochlorothiazid (Maxzide-25) 37.5-25 mg tablet Take 1 tablet by mouth once daily.      camphor-menthoL (Sarna) lotion Apply topically if needed for itching. Take per directed      ondansetron (Zofran) 8 mg tablet Take 1 tablet (8 mg) by mouth every 8 hours if needed for nausea. (Patient not taking: Reported on 3/12/2024) 90 tablet 0    sennosides (Senokot) 8.6 mg tablet Take 1 tablet (8.6 mg) by mouth once daily. Pv Senna-S 60 Ct       Current Facility-Administered Medications on File Prior to Visit   Medication Dose Route Frequency Provider Last Rate Last Admin    ondansetron (Zofran) injection 8 mg  8 mg intravenous Once Kendra Zhang PA-C         Vital Signs:  BP (!) 160/108 (BP Location: Left arm, Patient Position: Standing, BP Cuff Size: Adult long)   Pulse 88   Temp 35.8 °C (96.4 °F) (Temporal)   Resp 20   Wt 73.9 kg (162 lb 14.7 oz)   SpO2 94%   BMI 26.21 kg/m²     Physical Exam:  ECO-2  Pain: 0  Constitutional: Well developed, awake/alert/oriented x3, no distress, alert and cooperative  Eyes: PER. sclera anicteric  ENMT: Oral mucosa moist. Increased mucous production (spitting into container)  Respiratory/Thorax: Breathing seems labored at times. Lungs are clear to auscultation bilaterally. No adventitious breath sounds  Cardiovascular: S1-S2. Regular rate and rhythm. No murmurs, rubs, or gallops appreciated  Gastrointestinal: Abdomen soft nontender, nondistended, normal active bowel sounds.   Musculoskeletal: ROM intact, no joint swelling. Generalized weakness. Difficulty standing. Veers to right when walking.   Extremities: normal extremities, no cyanosis, no edema, no clubbing  Breast: Bilateral breast exam completed. Left breast no lesions or masses. Right breast firm lesions felt 11 o'clock to 12 o'clock position and into tail of right breast. Palpable lesions in right axilla. Left axilla no palpable lymph nodes.   Lymphatics: axillary as noted above.   Neurologic: alert  and oriented x3. Nonfocal exam. No myoclonus  Psychological: Pleasant, appropriate and easily engaged       Radiology:  STUDY:  BI US BREAST LIMITED RIGHT; 11/16/2023 8:04 am      INDICATION:  Signs/Symptoms:Right axillary fullness and arm swelling.. Patient has  known breast malignancy with metastatic disease. Recent CT 09/21/2023  demonstrated right breast mass along with right axillary tail mass.      COMPARISON:  Only available comparison study is the CT 09/21/2023 and an  ultrasound of the right breast from 06/13/2022 from [facility].      ACCESSION NUMBER(S):  [id number]      ORDERING CLINICIAN:  LAURENCE TEMPLETON      TECHNIQUE:  Grey scale duplex image of the breast tissue was obtained.      FINDINGS:  Exam was targeted to the patient's right breast mass and right  axillary tail mass.      *Within the right axillary tail there is a solid macrolobulated mass  with marked increased stiffness on elastography without internal  vascularity demonstrated measuring 2.0 x 2.2 x 2.4 cm. On the outside  previous examination the axillary tail mass measuring approximately  1.2 x 1.8 cm. *There is a right breast mass 11 o'clock position with  a distance nipple of 4 cm measuring approximately 1.0 x 1.0 x 1.3 cm  demonstrating spiculated margins with acoustic shadowing and  increased stiffness on elastography. On the previous ultrasound exam  this measured approximately 0.4 x 0.4 x 0.7 cm.      IMPRESSION:  Examination was targeted to the right breast mass and right axillary  tail mass as detailed above both of which appear to have increased in  size compared to 06/13/2022 ultrasound study. No previous  mammographic imaging is available. You may wish to consider obtaining  bilateral mammography for assessment of the left breast as well as  documentation of lesion size and morphology on the right.    Pathology: N/A      Assessment/Plan:  Debra Sheppard is a 68 y.o. female with a history of metastatic ER+/Her2+ breast cancer,  who presents today for follow-up evaluation on anastrozole and trastuzumab (Herceptin).    Malignant neoplasm of unspecified site of right female breast (CMS/HCC)  Stage IV (de adalid), ER+/Her2+, with LN, subcutaneous tissue, bone, lung, liver metastases at diagnosis.    - Based on most recent US results and recent outside CT CAP (w/o contrast), it appears that right breast mass and right axillary LN's have increased in size (mass increased by >1 cm, according to radiology report). No new obvious sites of metastatic disease, although I am only able to compare reports from different institutions, as she is not interested in having her scans done consistently at the same place. We have discussed that this makes comparison of results difficult. I also do not have images to compare myself.  - Based on reports, thankfully no obvious new sites of metastatic disease.  - She is concerned anastrozole is causing her nausea, despite being on this medication for years. I have recommended changing to exemestane, as this may improve effectiveness with recent CT findings. Ideally, would also add pertuzumab, but will hold for now.  - Continue Herceptin 6 mg/kg q3 weeks for now. I recommend repeat right breast US to assess response- to be ordered at next visit. She is agreeable.  - ECHO with normal LVEF 10/2023. Continue q6 month monitoring, next due 4/2024. To be ordered at next visit,  - Instructed to call with worsening neuro symptoms- need to consider CNS imaging for tremors, but would like to wait for now. These are at least reportedly stable.  - We can likely not accommodate her request to have Herceptin mixed at an outside facility. Will discuss with pharmacy and clinic leadership.    Shortness of breath  Improved. Long-standing history of asthma.  Uses albuterol nebulizers at home.    Gastritis without bleeding  Notes significant acid reflux/burning symptoms. Seeing GI next week.     Disposition.  - Herceptin q3 weeks. FUV  6-9 weeks.  - She has our contact information and was instructed to call with concerns/questions in the interim.  - We DO NOT have contact information for her, and she is not interested in providing us a phone number to discuss results (discussed today).    March 12, 2024  - Holding Herceptin today due to hypertension (ongoing) and new/worsening neurological symptoms. Very unsteady sitting to standing. Difficulty with balance. Veers to right when walking. I recommended patient be evaluated in the ER. She is not interested as she reports having CT of head completed at [facility] or [place] since last visit. I am unable to access. Was reportedly seen by ENT.   She asks about meclizine. I am hesitant to prescribe as I am not certain what is causing change in balance/walking. However as she is refusing ER perhaps it may prevent fall if she is truly presenting with vertigo and no other neurologic issues (stroke/brain mets) as I frankly told her I was concerned about.    She is agreeable to repeat ultrasound right breast and axilla to compare to November ultrasound.  We will meet in 2 weeks to review ultrasound and she is to bring copy of CT head results (I request disk and paper report).     Jeff Delgado, [place]-CNP

## 2024-03-18 ENCOUNTER — TELEPHONE (OUTPATIENT)
Dept: HEMATOLOGY/ONCOLOGY | Facility: CLINIC | Age: 69
End: 2024-03-18
Payer: MEDICARE

## 2024-03-18 NOTE — TELEPHONE ENCOUNTER
Spoke to Jeff Delgado CNP.  She states patient will only agree to the Breast US and not a mammogram.  Jaime notified by myself.

## 2024-03-22 ENCOUNTER — HOSPITAL ENCOUNTER (OUTPATIENT)
Dept: RADIOLOGY | Facility: HOSPITAL | Age: 69
Discharge: HOME | End: 2024-03-22
Payer: MEDICARE

## 2024-03-22 DIAGNOSIS — C50.911 MALIGNANT NEOPLASM OF RIGHT FEMALE BREAST, UNSPECIFIED ESTROGEN RECEPTOR STATUS, UNSPECIFIED SITE OF BREAST (MULTI): ICD-10-CM

## 2024-03-22 PROCEDURE — 76642 ULTRASOUND BREAST LIMITED: CPT | Mod: RT

## 2024-03-22 PROCEDURE — 76982 USE 1ST TARGET LESION: CPT | Mod: RT

## 2024-03-22 PROCEDURE — 76642 ULTRASOUND BREAST LIMITED: CPT | Mod: RIGHT SIDE | Performed by: RADIOLOGY

## 2024-03-27 ENCOUNTER — TELEPHONE (OUTPATIENT)
Dept: HEMATOLOGY/ONCOLOGY | Facility: CLINIC | Age: 69
End: 2024-03-27

## 2024-03-27 ENCOUNTER — APPOINTMENT (OUTPATIENT)
Dept: RADIOLOGY | Facility: HOSPITAL | Age: 69
End: 2024-03-27
Payer: MEDICARE

## 2024-03-27 ENCOUNTER — APPOINTMENT (OUTPATIENT)
Dept: HEMATOLOGY/ONCOLOGY | Facility: CLINIC | Age: 69
End: 2024-03-27
Payer: MEDICARE

## 2024-03-27 ENCOUNTER — HOSPITAL ENCOUNTER (EMERGENCY)
Facility: HOSPITAL | Age: 69
Discharge: HOME | End: 2024-03-27
Attending: EMERGENCY MEDICINE
Payer: MEDICARE

## 2024-03-27 ENCOUNTER — APPOINTMENT (OUTPATIENT)
Dept: CARDIOLOGY | Facility: HOSPITAL | Age: 69
End: 2024-03-27
Payer: MEDICARE

## 2024-03-27 VITALS
OXYGEN SATURATION: 100 % | BODY MASS INDEX: 24.8 KG/M2 | HEIGHT: 66 IN | RESPIRATION RATE: 14 BRPM | SYSTOLIC BLOOD PRESSURE: 141 MMHG | DIASTOLIC BLOOD PRESSURE: 93 MMHG | HEART RATE: 85 BPM | WEIGHT: 154.32 LBS | TEMPERATURE: 97.7 F

## 2024-03-27 DIAGNOSIS — R06.02 SHORTNESS OF BREATH: ICD-10-CM

## 2024-03-27 DIAGNOSIS — E87.6 HYPOKALEMIA: Primary | ICD-10-CM

## 2024-03-27 DIAGNOSIS — R53.1 GENERALIZED WEAKNESS: ICD-10-CM

## 2024-03-27 LAB
ALBUMIN SERPL-MCNC: 4 G/DL (ref 3.5–5)
ALP BLD-CCNC: 72 U/L (ref 35–125)
ALT SERPL-CCNC: 23 U/L (ref 5–40)
ANION GAP SERPL CALC-SCNC: 16 MMOL/L
AST SERPL-CCNC: 21 U/L (ref 5–40)
BASOPHILS # BLD AUTO: 0.02 X10*3/UL (ref 0–0.1)
BASOPHILS NFR BLD AUTO: 0.2 %
BILIRUB SERPL-MCNC: 0.7 MG/DL (ref 0.1–1.2)
BUN SERPL-MCNC: 19 MG/DL (ref 8–25)
CALCIUM SERPL-MCNC: 9.9 MG/DL (ref 8.5–10.4)
CHLORIDE SERPL-SCNC: 92 MMOL/L (ref 97–107)
CO2 SERPL-SCNC: 33 MMOL/L (ref 24–31)
CREAT SERPL-MCNC: 1.5 MG/DL (ref 0.4–1.6)
EGFRCR SERPLBLD CKD-EPI 2021: 38 ML/MIN/1.73M*2
EOSINOPHIL # BLD AUTO: 0.31 X10*3/UL (ref 0–0.7)
EOSINOPHIL NFR BLD AUTO: 3 %
ERYTHROCYTE [DISTWIDTH] IN BLOOD BY AUTOMATED COUNT: 13.1 % (ref 11.5–14.5)
GLUCOSE SERPL-MCNC: 115 MG/DL (ref 65–99)
HCT VFR BLD AUTO: 40.2 % (ref 36–46)
HGB BLD-MCNC: 13.5 G/DL (ref 12–16)
IMM GRANULOCYTES # BLD AUTO: 0.05 X10*3/UL (ref 0–0.7)
IMM GRANULOCYTES NFR BLD AUTO: 0.5 % (ref 0–0.9)
LYMPHOCYTES # BLD AUTO: 1.79 X10*3/UL (ref 1.2–4.8)
LYMPHOCYTES NFR BLD AUTO: 17.3 %
MAGNESIUM SERPL-MCNC: 2.3 MG/DL (ref 1.6–3.1)
MCH RBC QN AUTO: 29.9 PG (ref 26–34)
MCHC RBC AUTO-ENTMCNC: 33.6 G/DL (ref 32–36)
MCV RBC AUTO: 89 FL (ref 80–100)
MONOCYTES # BLD AUTO: 0.64 X10*3/UL (ref 0.1–1)
MONOCYTES NFR BLD AUTO: 6.2 %
NEUTROPHILS # BLD AUTO: 7.52 X10*3/UL (ref 1.2–7.7)
NEUTROPHILS NFR BLD AUTO: 72.8 %
NRBC BLD-RTO: 0 /100 WBCS (ref 0–0)
NT-PROBNP SERPL-MCNC: <36 PG/ML (ref 0–353)
PLATELET # BLD AUTO: 319 X10*3/UL (ref 150–450)
POTASSIUM SERPL-SCNC: 2.4 MMOL/L (ref 3.4–5.1)
PROT SERPL-MCNC: 7.7 G/DL (ref 5.9–7.9)
RBC # BLD AUTO: 4.52 X10*6/UL (ref 4–5.2)
SODIUM SERPL-SCNC: 141 MMOL/L (ref 133–145)
TROPONIN T SERPL-MCNC: 24 NG/L
TROPONIN T SERPL-MCNC: 31 NG/L
WBC # BLD AUTO: 10.3 X10*3/UL (ref 4.4–11.3)

## 2024-03-27 PROCEDURE — 96361 HYDRATE IV INFUSION ADD-ON: CPT

## 2024-03-27 PROCEDURE — 83880 ASSAY OF NATRIURETIC PEPTIDE: CPT

## 2024-03-27 PROCEDURE — 80053 COMPREHEN METABOLIC PANEL: CPT

## 2024-03-27 PROCEDURE — 36415 COLL VENOUS BLD VENIPUNCTURE: CPT

## 2024-03-27 PROCEDURE — 2500000002 HC RX 250 W HCPCS SELF ADMINISTERED DRUGS (ALT 637 FOR MEDICARE OP, ALT 636 FOR OP/ED)

## 2024-03-27 PROCEDURE — 71046 X-RAY EXAM CHEST 2 VIEWS: CPT | Performed by: RADIOLOGY

## 2024-03-27 PROCEDURE — 84484 ASSAY OF TROPONIN QUANT: CPT

## 2024-03-27 PROCEDURE — 96366 THER/PROPH/DIAG IV INF ADDON: CPT

## 2024-03-27 PROCEDURE — 2500000004 HC RX 250 GENERAL PHARMACY W/ HCPCS (ALT 636 FOR OP/ED)

## 2024-03-27 PROCEDURE — 85025 COMPLETE CBC W/AUTO DIFF WBC: CPT

## 2024-03-27 PROCEDURE — 93005 ELECTROCARDIOGRAM TRACING: CPT

## 2024-03-27 PROCEDURE — 71046 X-RAY EXAM CHEST 2 VIEWS: CPT

## 2024-03-27 PROCEDURE — 99284 EMERGENCY DEPT VISIT MOD MDM: CPT | Mod: 25

## 2024-03-27 PROCEDURE — 96365 THER/PROPH/DIAG IV INF INIT: CPT

## 2024-03-27 PROCEDURE — 96375 TX/PRO/DX INJ NEW DRUG ADDON: CPT

## 2024-03-27 PROCEDURE — 83735 ASSAY OF MAGNESIUM: CPT

## 2024-03-27 PROCEDURE — 94640 AIRWAY INHALATION TREATMENT: CPT

## 2024-03-27 RX ORDER — POTASSIUM CHLORIDE 20 MEQ/1
20 TABLET, EXTENDED RELEASE ORAL DAILY
Qty: 20 TABLET | Refills: 0 | Status: SHIPPED | OUTPATIENT
Start: 2024-03-27 | End: 2024-04-16

## 2024-03-27 RX ORDER — IPRATROPIUM BROMIDE AND ALBUTEROL SULFATE 2.5; .5 MG/3ML; MG/3ML
3 SOLUTION RESPIRATORY (INHALATION)
Status: DISCONTINUED | OUTPATIENT
Start: 2024-03-27 | End: 2024-03-27

## 2024-03-27 RX ORDER — POTASSIUM CHLORIDE 20 MEQ/1
80 TABLET, EXTENDED RELEASE ORAL ONCE
Status: COMPLETED | OUTPATIENT
Start: 2024-03-27 | End: 2024-03-27

## 2024-03-27 RX ORDER — POTASSIUM CHLORIDE 14.9 MG/ML
20 INJECTION INTRAVENOUS ONCE
Status: COMPLETED | OUTPATIENT
Start: 2024-03-27 | End: 2024-03-27

## 2024-03-27 RX ORDER — METHYLPREDNISOLONE 4 MG/1
TABLET ORAL
Qty: 21 TABLET | Refills: 0 | Status: SHIPPED | OUTPATIENT
Start: 2024-03-27 | End: 2024-04-03

## 2024-03-27 RX ORDER — IPRATROPIUM BROMIDE AND ALBUTEROL SULFATE 2.5; .5 MG/3ML; MG/3ML
3 SOLUTION RESPIRATORY (INHALATION) ONCE
Status: COMPLETED | OUTPATIENT
Start: 2024-03-27 | End: 2024-03-27

## 2024-03-27 RX ADMIN — IPRATROPIUM BROMIDE AND ALBUTEROL SULFATE 3 ML: 2.5; .5 SOLUTION RESPIRATORY (INHALATION) at 11:08

## 2024-03-27 RX ADMIN — METHYLPREDNISOLONE SODIUM SUCCINATE 125 MG: 125 INJECTION, POWDER, FOR SOLUTION INTRAMUSCULAR; INTRAVENOUS at 14:05

## 2024-03-27 RX ADMIN — POTASSIUM CHLORIDE 20 MEQ: 200 INJECTION, SOLUTION INTRAVENOUS at 14:05

## 2024-03-27 RX ADMIN — SODIUM CHLORIDE 500 ML: 900 INJECTION, SOLUTION INTRAVENOUS at 10:35

## 2024-03-27 RX ADMIN — POTASSIUM CHLORIDE 80 MEQ: 1500 TABLET, EXTENDED RELEASE ORAL at 12:12

## 2024-03-27 ASSESSMENT — LIFESTYLE VARIABLES
EVER FELT BAD OR GUILTY ABOUT YOUR DRINKING: NO
HAVE PEOPLE ANNOYED YOU BY CRITICIZING YOUR DRINKING: NO
TOTAL SCORE: 0
EVER HAD A DRINK FIRST THING IN THE MORNING TO STEADY YOUR NERVES TO GET RID OF A HANGOVER: NO
HAVE YOU EVER FELT YOU SHOULD CUT DOWN ON YOUR DRINKING: NO

## 2024-03-27 NOTE — ED TRIAGE NOTES
SoB, asthenia with chest congestion for about a month now. PT says she is concerned she is getting bronchitis and wants to get ahead of it. Has had trouble eating for some time now. Lost about 20 lbs in the last month. Currently has breast cx, on chemotherapy, last treatment was a month ago.

## 2024-03-27 NOTE — ED PROVIDER NOTES
HPI   Chief Complaint   Patient presents with    SoB, Asthenia        Patient is a 68-year-old female with a history of breast cancer currently going through chemo and hypertension presenting to the emergency department for evaluation of shortness of breath, weakness, and cough.  Patient states symptoms has been going on for a few weeks.  She states she noticed that she has been coughing up a lot of clear phlegm.  She is concerned that she could possibly have bronchitis.  She states she feels like there is something in her lungs that is causing her to have some difficulty breathing.  Nothing makes his symptoms better or worse.  She denies chest pain, fever, chills, nausea, vomiting, abdominal pain, recent travel, congestion, headaches, numbness, tingling, hematuria, dysuria, constipation, diarrhea.                          Long Coma Scale Score: 15                     Patient History   Past Medical History:   Diagnosis Date    Breast cancer (CMS/HCC)     Hypertension      No past surgical history on file.  Family History   Problem Relation Name Age of Onset    Breast cancer Sister          Triple-negative BC     Social History     Tobacco Use    Smoking status: Never     Passive exposure: Never    Smokeless tobacco: Never   Vaping Use    Vaping Use: Never used   Substance Use Topics    Alcohol use: Never    Drug use: Never       Physical Exam   ED Triage Vitals [03/27/24 0956]   Temperature Heart Rate Respirations BP   36.5 °C (97.7 °F) 85 14 (!) 141/93      Pulse Ox Temp src Heart Rate Source Patient Position   100 % -- -- --      BP Location FiO2 (%)     -- --       Physical Exam  Vitals and nursing note reviewed.   Constitutional:       General: She is not in acute distress.     Appearance: Normal appearance. She is not ill-appearing or toxic-appearing.   HENT:      Head: Normocephalic and atraumatic.      Nose: Nose normal.      Mouth/Throat:      Mouth: Mucous membranes are dry.   Eyes:      Extraocular  Movements: Extraocular movements intact.      Conjunctiva/sclera: Conjunctivae normal.      Pupils: Pupils are equal, round, and reactive to light.   Cardiovascular:      Rate and Rhythm: Normal rate.      Pulses: Normal pulses.      Heart sounds: Normal heart sounds. No murmur heard.     No friction rub. No gallop.   Pulmonary:      Effort: Pulmonary effort is normal.      Breath sounds: Rhonchi present.   Abdominal:      General: Abdomen is flat.      Palpations: Abdomen is soft.      Tenderness: There is no abdominal tenderness. There is no guarding or rebound.   Musculoskeletal:         General: Normal range of motion.      Cervical back: Normal range of motion.   Skin:     General: Skin is warm and dry.   Neurological:      General: No focal deficit present.      Mental Status: She is alert and oriented to person, place, and time.      Sensory: No sensory deficit.      Motor: No weakness.   Psychiatric:         Mood and Affect: Mood normal.         Behavior: Behavior normal.         ED Course & MDM   ED Course as of 03/27/24 1433   Wed Mar 27, 2024   1021 EKG reviewed and interpreted by me: Normal sinus rhythm rate of 91 normal axis, prolonged QT interval no STEMI criteria [NM]   1315 XR chest 2 views [AJ]      ED Course User Index  [AJ] Elizabeth Walls PA-C  [NM] Gareth Jose MD         Diagnoses as of 03/27/24 1433   Hypokalemia   Shortness of breath   Generalized weakness       Medical Decision Making  **Disclaimer parts of this chart have been completed using voice recognition software. Please excuse any errors of transcription.     Patient seen in conjunction with attending physician Dr. Schuster.    HPI: Detailed above.    Exam: A medically appropriate exam performed, outlined above, given the known history and presentation.    History obtained from: Patient    EKG: Reviewed and interpreted by my attending physician    Labs/Diagnostics:  Labs Reviewed   COMPREHENSIVE METABOLIC PANEL - Abnormal       Result  Value    Glucose 115 (*)     Sodium 141      Potassium 2.4 (*)     Chloride 92 (*)     Bicarbonate 33 (*)     Urea Nitrogen 19      Creatinine 1.50      eGFR 38 (*)     Calcium 9.9      Albumin 4.0      Alkaline Phosphatase 72      Total Protein 7.7      AST 21      Bilirubin, Total 0.7      ALT 23      Anion Gap 16     SERIAL TROPONIN, INITIAL ([facility]) - Abnormal    Troponin T, High Sensitivity 31 (*)    SERIAL TROPONIN,  2 HOUR ([facility]) - Abnormal    Troponin T, High Sensitivity 24 (*)    MAGNESIUM - Normal    Magnesium 2.30     N-TERMINAL PROBNP - Normal    PROBNP <36      Narrative:     Reference ranges are based on clinical submission data. These ranges represent the 95th percentile of normal cut-off points. As NT Pro- BNP values approach 1000 pg/ml, clinical symptoms are more likely associated with CHF.   CBC WITH AUTO DIFFERENTIAL    WBC 10.3      nRBC 0.0      RBC 4.52      Hemoglobin 13.5      Hematocrit 40.2      MCV 89      MCH 29.9      MCHC 33.6      RDW 13.1      Platelets 319      Neutrophils % 72.8      Immature Granulocytes %, Automated 0.5      Lymphocytes % 17.3      Monocytes % 6.2      Eosinophils % 3.0      Basophils % 0.2      Neutrophils Absolute 7.52      Immature Granulocytes Absolute, Automated 0.05      Lymphocytes Absolute 1.79      Monocytes Absolute 0.64      Eosinophils Absolute 0.31      Basophils Absolute 0.02     URINALYSIS WITH REFLEX CULTURE AND MICROSCOPIC    Narrative:     The following orders were created for panel order Urinalysis with Reflex Culture and Microscopic.  Procedure                               Abnormality         Status                     ---------                               -----------         ------                     Urinalysis with Reflex C...[[id number]]                                                 Extra Urine Gray Tube[[id number]]                                                         Please view results for these tests on the individual orders.    SARS-COV-2 AND INFLUENZA A/B PCR   RSV PCR   TROPONIN T SERIES, HIGH SENSITIVITY (0, 2 HR, 6 HR)    Narrative:     The following orders were created for panel order Troponin T Series, High Sensitivity (0, 2HR, 6HR).  Procedure                               Abnormality         Status                     ---------                               -----------         ------                     Serial Troponin, Initial...[[id number]]  Abnormal            Final result               Serial Troponin, 2 Hour ...[[id number]]  Abnormal            Final result               Serial Troponin, 6 Hour ...[[id number]]                                                   Please view results for these tests on the individual orders.   URINALYSIS WITH REFLEX CULTURE AND MICROSCOPIC   EXTRA URINE GRAY TUBE   SERIAL TROPONIN, 6 HOUR ([facility])     XR chest 2 views   Final Result   No acute cardiopulmonary disease.        Signed by: Barbi Loja 3/27/2024 11:56 AM   Dictation workstation:   [id number]        EMERGENCY DEPARTMENT COURSE and DIFFERENTIAL DIAGNOSIS/MDM:  Patient is a 68-year-old female presenting to the emergency department for evaluation of shortness of breath, weakness, cough.  On physical exam vital signs remarkable for hypertension but this is stable patient is in no acute distress.  She is satting at 100% on room air.  No increased work of breathing.   She does have diffuse rhonchi noted throughout her lung fields.  Diagnostic labs and imaging ordered.  Initial troponin 31 and second troponin 24.  proBNP less than 36.  CMP showed a potassium of 2.4 and therefore oral and IV potassium was given.  Chloride decreased at 92 CMP otherwise unremarkable.  Magnesium normal.  CBC showed no leukocytosis or anemia.  Chest x-ray showed no acute cardiopulmonary disease with no evidence of pneumonia or pneumothorax.  Suspect patient's symptoms could be due to a viral upper respiratory infection.  She is feeling better after DuoNeb and  "Solu-Medrol.  She will be discharged with prescription for Medrol Dosepak.  She was advised to follow-up with primary care physician outpatient within the next 1 to 2 days.         Vitals:    Vitals:    03/27/24 0956 03/27/24 1002   BP: (!) 141/93    Pulse: 85    Resp: 14    Temp: 36.5 °C (97.7 °F)    SpO2: 100% 100%   Weight: 70 kg (154 lb 5.2 oz)    Height: 1.676 m (5' 6\")      History Limited by:    None    Independent history obtained from:    None      External records reviewed:    None      Diagnostics interpreted by me:    Xrays - see my independent interpretation in MDM      Discussions with other clinicians:    None      Chronic conditions impacting care:    Hypertension and Cancer      Social determinants of health affecting care:    None    Diagnostic tests considered but not performed: None    ED Medications managed:    Medications   potassium chloride 20 mEq in 100 mL IV premix (20 mEq intravenous New Bag 3/27/24 1405)   sodium chloride 0.9 % bolus 500 mL (0 mL intravenous Stopped 3/27/24 1135)   ipratropium-albuteroL (Duo-Neb) 0.5-2.5 mg/3 mL nebulizer solution 3 mL (3 mL nebulization Given 3/27/24 1108)   potassium chloride CR (Klor-Con M20) ER tablet 80 mEq (80 mEq oral Given 3/27/24 1212)   methylPREDNISolone sod succinate (SOLU-Medrol) injection 125 mg (125 mg intravenous Given 3/27/24 1405)         Prescription drugs considered:     Medrol Dosepak      Procedure  Procedures     Elizabeth Walls PA-C  03/27/24 1533    "

## 2024-03-27 NOTE — DISCHARGE INSTRUCTIONS
You were seen in the emergency department today for evaluation of shortness of breath and generalized weakness.  Diagnostic labs came back remarkable for a decrease in potassium.  You were given potassium supplementation in the emergency department.  Chest x-ray came back unremarkable.  You were discharged with a prescription for a Medrol Dosepak.  We recommend you follow-up with primary care physician outpatient within the next 1 to 2 days.  Return to the emergency department at anytime with any new or worsening symptoms.

## 2024-03-29 ENCOUNTER — APPOINTMENT (OUTPATIENT)
Dept: HEMATOLOGY/ONCOLOGY | Facility: CLINIC | Age: 69
End: 2024-03-29
Payer: MEDICARE

## 2024-04-03 ENCOUNTER — INFUSION (OUTPATIENT)
Dept: HEMATOLOGY/ONCOLOGY | Facility: CLINIC | Age: 69
End: 2024-04-03
Payer: MEDICARE

## 2024-04-03 ENCOUNTER — OFFICE VISIT (OUTPATIENT)
Dept: HEMATOLOGY/ONCOLOGY | Facility: CLINIC | Age: 69
End: 2024-04-03
Payer: MEDICARE

## 2024-04-03 VITALS
RESPIRATION RATE: 18 BRPM | TEMPERATURE: 96.5 F | SYSTOLIC BLOOD PRESSURE: 148 MMHG | DIASTOLIC BLOOD PRESSURE: 98 MMHG | OXYGEN SATURATION: 97 % | WEIGHT: 152.67 LBS | HEIGHT: 66 IN | BODY MASS INDEX: 24.54 KG/M2 | HEART RATE: 96 BPM

## 2024-04-03 DIAGNOSIS — C50.911 MALIGNANT NEOPLASM OF RIGHT BREAST IN FEMALE, ESTROGEN RECEPTOR POSITIVE, UNSPECIFIED SITE OF BREAST (MULTI): ICD-10-CM

## 2024-04-03 DIAGNOSIS — R06.02 SHORTNESS OF BREATH: ICD-10-CM

## 2024-04-03 DIAGNOSIS — C50.911 MALIGNANT NEOPLASM OF RIGHT FEMALE BREAST, UNSPECIFIED ESTROGEN RECEPTOR STATUS, UNSPECIFIED SITE OF BREAST (MULTI): ICD-10-CM

## 2024-04-03 DIAGNOSIS — Z17.0 MALIGNANT NEOPLASM OF RIGHT BREAST IN FEMALE, ESTROGEN RECEPTOR POSITIVE, UNSPECIFIED SITE OF BREAST (MULTI): ICD-10-CM

## 2024-04-03 DIAGNOSIS — K29.70 GASTRITIS WITHOUT BLEEDING, UNSPECIFIED CHRONICITY, UNSPECIFIED GASTRITIS TYPE: ICD-10-CM

## 2024-04-03 DIAGNOSIS — Z51.81 ENCOUNTER FOR MONITORING CARDIOTOXIC DRUG THERAPY: Primary | ICD-10-CM

## 2024-04-03 DIAGNOSIS — Z79.899 ENCOUNTER FOR MONITORING CARDIOTOXIC DRUG THERAPY: Primary | ICD-10-CM

## 2024-04-03 DIAGNOSIS — R25.1 TREMOR: ICD-10-CM

## 2024-04-03 PROCEDURE — 1159F MED LIST DOCD IN RCRD: CPT | Performed by: INTERNAL MEDICINE

## 2024-04-03 PROCEDURE — 99215 OFFICE O/P EST HI 40 MIN: CPT | Performed by: INTERNAL MEDICINE

## 2024-04-03 PROCEDURE — 1157F ADVNC CARE PLAN IN RCRD: CPT | Performed by: INTERNAL MEDICINE

## 2024-04-03 PROCEDURE — 1126F AMNT PAIN NOTED NONE PRSNT: CPT | Performed by: INTERNAL MEDICINE

## 2024-04-03 PROCEDURE — 3077F SYST BP >= 140 MM HG: CPT | Performed by: INTERNAL MEDICINE

## 2024-04-03 PROCEDURE — 2500000004 HC RX 250 GENERAL PHARMACY W/ HCPCS (ALT 636 FOR OP/ED): Performed by: INTERNAL MEDICINE

## 2024-04-03 PROCEDURE — 3080F DIAST BP >= 90 MM HG: CPT | Performed by: INTERNAL MEDICINE

## 2024-04-03 PROCEDURE — 96413 CHEMO IV INFUSION 1 HR: CPT

## 2024-04-03 RX ORDER — EXEMESTANE 25 MG/1
25 TABLET ORAL DAILY
Qty: 30 TABLET | Refills: 1 | Status: SHIPPED | OUTPATIENT
Start: 2024-04-03 | End: 2024-07-02

## 2024-04-03 RX ORDER — HEPARIN SODIUM,PORCINE/PF 10 UNIT/ML
50 SYRINGE (ML) INTRAVENOUS AS NEEDED
Status: DISCONTINUED | OUTPATIENT
Start: 2024-04-03 | End: 2024-04-03 | Stop reason: HOSPADM

## 2024-04-03 RX ORDER — HEPARIN 100 UNIT/ML
500 SYRINGE INTRAVENOUS AS NEEDED
Status: DISCONTINUED | OUTPATIENT
Start: 2024-04-03 | End: 2024-04-03 | Stop reason: HOSPADM

## 2024-04-03 RX ORDER — FAMOTIDINE 10 MG/ML
20 INJECTION INTRAVENOUS ONCE AS NEEDED
Status: DISCONTINUED | OUTPATIENT
Start: 2024-04-03 | End: 2024-04-03 | Stop reason: HOSPADM

## 2024-04-03 RX ORDER — PROCHLORPERAZINE EDISYLATE 5 MG/ML
10 INJECTION INTRAMUSCULAR; INTRAVENOUS EVERY 6 HOURS PRN
Status: DISCONTINUED | OUTPATIENT
Start: 2024-04-03 | End: 2024-04-03 | Stop reason: HOSPADM

## 2024-04-03 RX ORDER — DIPHENHYDRAMINE HYDROCHLORIDE 50 MG/ML
50 INJECTION INTRAMUSCULAR; INTRAVENOUS AS NEEDED
Status: DISCONTINUED | OUTPATIENT
Start: 2024-04-03 | End: 2024-04-03 | Stop reason: HOSPADM

## 2024-04-03 RX ORDER — EPINEPHRINE 0.3 MG/.3ML
0.3 INJECTION SUBCUTANEOUS EVERY 5 MIN PRN
Status: DISCONTINUED | OUTPATIENT
Start: 2024-04-03 | End: 2024-04-03 | Stop reason: HOSPADM

## 2024-04-03 RX ORDER — PROCHLORPERAZINE MALEATE 10 MG
10 TABLET ORAL EVERY 6 HOURS PRN
Status: DISCONTINUED | OUTPATIENT
Start: 2024-04-03 | End: 2024-04-03 | Stop reason: HOSPADM

## 2024-04-03 RX ORDER — HEPARIN 100 UNIT/ML
500 SYRINGE INTRAVENOUS AS NEEDED
OUTPATIENT
Start: 2024-04-03

## 2024-04-03 RX ORDER — HEPARIN SODIUM,PORCINE/PF 10 UNIT/ML
50 SYRINGE (ML) INTRAVENOUS AS NEEDED
OUTPATIENT
Start: 2024-04-03

## 2024-04-03 RX ORDER — ALBUTEROL SULFATE 0.83 MG/ML
3 SOLUTION RESPIRATORY (INHALATION) AS NEEDED
Status: DISCONTINUED | OUTPATIENT
Start: 2024-04-03 | End: 2024-04-03 | Stop reason: HOSPADM

## 2024-04-03 RX ADMIN — TRASTUZUMAB 420 MG: 150 INJECTION, POWDER, LYOPHILIZED, FOR SOLUTION INTRAVENOUS at 15:27

## 2024-04-03 ASSESSMENT — PAIN SCALES - GENERAL: PAINLEVEL: 0-NO PAIN

## 2024-04-03 NOTE — PROGRESS NOTES
"This RN accompanied Dr. Watts into today's clinic visit. Patient appears weak and fatigued while sitting up in wheelchair. Afebrile, chronic HTN (better than usual), 96,18, 97% RA with no pain. SOB while speaking, denies cough, moderate amounts of productive clear sputum present. Patient states \"she's eating and drinking better\" despite > 4kg loss in 3 weeks.  While present Dr. Watts discussed recent results, treatment plan changes, and compliance to standard imaging requests neccessary to monitor care properly. Patient was hesitant at first. Verbal education, emotional support, and specific patient questions answered by both Dr. Watts and this RN. Patient receptive to communication and agreeable to future imaging requests. Patient provided CD of images from recent visit at [facility]. Patient agree to leave CD with office for 1 week. DANA Rodrigez made electronic request to  [facility]. Emotional support provided on recent results and changes. Patient denies driving and feels safe at home. Patient wheeled to infusion per request \"Too tired, to walk\".  Verbal handoff provided to DANA Welsh.     While in infusion visit, this RN reviewed future schedule visits for ordered ECHO, CT, and MRI - 4/12/24 at [facility]. Follow up appointment with Dr. Watts following results. Prescription refill request given to GENNY Zhang to refill at [facility] on [address], [address]. NP agreed.     No phone or email contact information provided upon request. Verbal education provided. Patient continues to deny and states \"you know I'm working on that plan\".     "

## 2024-04-03 NOTE — PROGRESS NOTES
Pt seen in office today for a follow up visit with Dr. Watts for management of her breast cancer.  She is  without complaints today and denies pain. She arrives to her visit today in a wheelchair. Annie Valenzuela, RN, BSN has accompanied patient for this visit today.    Medications, pharmacy preference and allergies were reviewed with patient and updated in the medical record.     Per orders, she has agreed to have imaging done here at , including a CT scan of c/a/p, an MRI of the  head and  of the breast, to be done within the next 3 weeks. She is to return to clinic in 3 weeks to review these results with Dr. Watts. She is to receive her treatment of trastuzumab today following her visit as scheduled.    Our contact information was given to patient and they were encouraged to contact us with any questions or concerns.     Patient verbalized understanding and agreement regarding discussed information via verbal feedback. Pt escorted to Day treatment, chair 7.

## 2024-04-07 NOTE — PROGRESS NOTES
DIVISION OF MEDICAL ONCOLOGY    Patient ID: Debra Sheppard is a 68 y.o. female.  MRN: [id number]  : 1955  The patient presents to clinic today for her history of metastatic breast cancer.    Diagnostic/Therapeutic History:  Diagnosis: Breast cancer, Stage IV (Primary, Right breast overlapping sites, T4c, NX, cM1, G2, ER Status: Positive, SD Status: Negative, HER-2/yue  Status: Positive)-Clinical ).  She noted a right breast mass for several years. This progressively worsened with involvement of the right chest wall, shoulder along with a mass on her back. She had a CT scan of her chest and abdomen that showed multiple hypodense lesions throughout  the liver- borderline left external iliac lymph nodes that are multiple small soft tissue nodules present in the right anterior omentum. -3.1 x 2.5 soft tissue lesion in the subcutaneous tissue of the posterior chest. There is tiny nonspecific sclerotic  lesion in the left femoral neck, right iliac and left acetabulum.  Her CT scan of the chest revealed several nodules in the lung bilaterally. There is also multiple large axillary lymph nodes along with large infiltrative right breast mass involving the entire breast and underlying chest wall. She has a 2.9 soft tissue  nodule in the chest posteriorly to the left.  She was not agreeable to chemotherapy, but to Herceptin and anastrozole.  She was not interested in bisphosphonates or denosumab.    History of Present Illness (HPI)/Interval History:  Debra Sheppard presents today for FUV and Herceptin infusion.  She was recently seen in the ER for dyspnea/cough. Diagnosed with bronchitis and given a course of steroids. She did not take the full course. Feeling somewhat better, but notes ongoing cough and green liquid. She worries that her swallowing ability is compromised.  She has lost weight. Notes fatigue.  States that she did not tolerate exemestane well- had a change in breast mass and developed symptoms after 1 dose, so  she returned to taking anastrozole.  Notes occasional discomfort in right breast.  She is willing to get updated CT scans and MRI.    Review of Systems:  14-point ROS otherwise negative, as per HPI/Interval History.    Past Medical History:   Diagnosis Date    Breast cancer (CMS/HCC)     Hypertension      Patient Active Problem List   Diagnosis    Elevated blood pressure reading in office with diagnosis of hypertension    Essential hypertension    Lymphedema of arm    Malignant neoplasm of unspecified site of right female breast (CMS/HCC)    Nipple anomaly    Vitamin D deficiency    Shortness of breath    Gastritis without bleeding      Social History     Socioeconomic History    Marital status:      Spouse name: Not on file    Number of children: Not on file    Years of education: Not on file    Highest education level: Not on file   Occupational History    Not on file   Tobacco Use    Smoking status: Never     Passive exposure: Never    Smokeless tobacco: Never   Vaping Use    Vaping Use: Never used   Substance and Sexual Activity    Alcohol use: Never    Drug use: Never    Sexual activity: Not on file   Other Topics Concern    Not on file   Social History Narrative    Not on file     Social Determinants of Health     Financial Resource Strain: Not on file   Food Insecurity: Not on file   Transportation Needs: Not on file   Physical Activity: Not on file   Stress: Not on file   Social Connections: Not on file   Intimate Partner Violence: Not on file   Housing Stability: Not on file       Family History   Problem Relation Name Age of Onset    Breast cancer Sister          Triple-negative BC       Allergies:   Allergies   Allergen Reactions    Ciprofloxacin Itching    Latex Unknown    Penicillins Unknown    Surgical Lubricant Jelly Unknown    Adhesive Rash    Sulfa (Sulfonamide Antibiotics) Rash         Current Outpatient Medications:     amLODIPine (Norvasc) 10 mg tablet, Take 1 tablet (10 mg) by mouth once  "daily., Disp: , Rfl:     exemestane (Aromasin) 25 mg tablet, Take 1 tablet (25 mg total) by mouth once daily.  Take after a meal.  Try to take at the same time each day., Disp: 30 tablet, Rfl: 1    albuterol 0.63 mg/3 mL nebulizer solution, Take 3 mL (0.63 mg) by nebulization every 6 hours if needed for wheezing or shortness of breath., Disp: 75 mL, Rfl: 1    camphor-menthoL (Sarna) lotion, Apply topically if needed for itching. Take per directed, Disp: , Rfl:     ondansetron (Zofran) 8 mg tablet, Take 1 tablet (8 mg) by mouth every 8 hours if needed for nausea. (Patient not taking: Reported on 3/12/2024), Disp: 90 tablet, Rfl: 0    pantoprazole (ProtoNix) 40 mg EC tablet, Take 1 tablet (40 mg) by mouth once daily in the morning. Take before meals. Do not crush, chew, or split., Disp: , Rfl:     potassium chloride CR (Klor-Con M20) 20 mEq ER tablet, Take 1 tablet (20 mEq) by mouth once daily for 20 days. Do not crush or chew., Disp: 20 tablet, Rfl: 0    sennosides (Senokot) 8.6 mg tablet, Take 1 tablet (8.6 mg) by mouth once daily. Pv Senna-S 60 Ct, Disp: , Rfl:     triamterene-hydrochlorothiazid (Maxzide-25) 37.5-25 mg tablet, Take 1 tablet by mouth once daily., Disp: , Rfl:     Current Facility-Administered Medications:     ondansetron (Zofran) injection 8 mg, 8 mg, intravenous, Once, Kendra Zhang PA-C     Objective    BSA: 1.8 meters squared  BP (!) 148/98 (BP Location: Left arm, Patient Position: Sitting, BP Cuff Size: Adult long)   Pulse 96   Temp 35.8 °C (96.5 °F) (Temporal)   Resp 18   Ht (S) 1.685 m (5' 6.34\")   Wt 69.3 kg (152 lb 10.7 oz)   SpO2 97%   BMI 24.39 kg/m²     ECOG Performance Status:  2 Ambulatory and capable of all selfcare but unable to carry out any work activities; up and about more than 50% of waking hours.    Wt Readings from Last 3 Encounters:   04/03/24 69.3 kg (152 lb 10.7 oz)   03/27/24 70 kg (154 lb 5.2 oz)   03/12/24 73.9 kg (162 lb 14.7 oz)     Physical Exam  Vitals and " nursing note reviewed.   Constitutional:       General: She is not in acute distress.     Appearance: Normal appearance. She is not ill-appearing.      Comments: Thinner than prior visit. Resting in wheelchair.   HENT:      Head: Normocephalic and atraumatic.      Comments: Wearing face mask.  Eyes:      General: No scleral icterus.  Cardiovascular:      Rate and Rhythm: Normal rate and regular rhythm.      Heart sounds: No murmur heard.  Pulmonary:      Breath sounds: Normal breath sounds. No wheezing.      Comments: Some dyspnea noted when talking.  Chest:   Breasts:     Right: Mass present.      Comments: ~4 cm right breast mass, near axilla.  Musculoskeletal:      Cervical back: Neck supple.   Lymphadenopathy:      Cervical: No cervical adenopathy.      Upper Body:      Right upper body: No supraclavicular adenopathy.      Left upper body: No supraclavicular adenopathy.   Neurological:      Mental Status: She is alert and oriented to person, place, and time. Mental status is at baseline.      Comments: Memory intact.       Laboratory Data:  Lab Results   Component Value Date    WBC 10.3 03/27/2024    HGB 13.5 03/27/2024    HCT 40.2 03/27/2024    MCV 89 03/27/2024     03/27/2024       Chemistry    Lab Results   Component Value Date/Time     03/27/2024 1034    K 2.4 (LL) 03/27/2024 1034    CL 92 (L) 03/27/2024 1034    CO2 33 (H) 03/27/2024 1034    BUN 19 03/27/2024 1034    CREATININE 1.50 03/27/2024 1034    Lab Results   Component Value Date/Time    CALCIUM 9.9 03/27/2024 1034    ALKPHOS 72 03/27/2024 1034    AST 21 03/27/2024 1034    ALT 23 03/27/2024 1034    BILITOT 0.7 03/27/2024 1034        Radiology:  STUDY:  BI US BREAST LIMITED RIGHT; 11/16/2023 8:04 am      INDICATION:  Signs/Symptoms:Right axillary fullness and arm swelling.. Patient has  known breast malignancy with metastatic disease. Recent CT 09/21/2023  demonstrated right breast mass along with right axillary tail mass.       COMPARISON:  Only available comparison study is the CT 09/21/2023 and an  ultrasound of the right breast from 06/13/2022 from [facility].      ACCESSION NUMBER(S):  [id number]      ORDERING CLINICIAN:  LAURENCE TEMPLETON      TECHNIQUE:  Grey scale duplex image of the breast tissue was obtained.      FINDINGS:  Exam was targeted to the patient's right breast mass and right  axillary tail mass.      *Within the right axillary tail there is a solid macrolobulated mass  with marked increased stiffness on elastography without internal  vascularity demonstrated measuring 2.0 x 2.2 x 2.4 cm. On the outside  previous examination the axillary tail mass measuring approximately  1.2 x 1.8 cm. *There is a right breast mass 11 o'clock position with  a distance nipple of 4 cm measuring approximately 1.0 x 1.0 x 1.3 cm  demonstrating spiculated margins with acoustic shadowing and  increased stiffness on elastography. On the previous ultrasound exam  this measured approximately 0.4 x 0.4 x 0.7 cm.      IMPRESSION:  Examination was targeted to the right breast mass and right axillary  tail mass as detailed above both of which appear to have increased in  size compared to 06/13/2022 ultrasound study. No previous  mammographic imaging is available. You may wish to consider obtaining  bilateral mammography for assessment of the left breast as well as  documentation of lesion size and morphology on the right.    Pathology: N/A      Assessment/Plan:  Debra Sheppard is a 68 y.o. female with a history of metastatic ER+/Her2+ breast cancer, who presents today for follow-up evaluation on anastrozole and trastuzumab (Herceptin).    #Metastatic breast cancer.  Stage IV (de adalid), ER+/Her2+, with LN, subcutaneous tissue, bone, lung, liver metastases at diagnosis.  - Recent right breast ultrasound with clear progression of breast lesion. Exam confirms this today.  - Discussed again that I would highly recommend changing her systemic therapy,  and that we should not continue anastrozole and Herceptin. I recommend obtaining full restaging scans with CT CAP and MRI brain. With weight loss and worsening functional status, I am very concerned for significant progression. She is agreeable to obtaining these scans, and I have asked her to have them performed at  so we can directly compare images. Previously, she has had scans done elsewhere, and I explained that it is not ideal for clinical care without the images.  - Updated ECHO has been ordered. Due this month. Continue q6 months.  - If progression confirmed on CT, would recommend adding Perjeta and switching her endocrine therapy. She is not interested in chemotherapy at this time. TDM1 (Kadcyla) could also be considered. I do not think Enhertu is a good option for her, especially with underlying respiratory symptoms at this time and inability to contact her with results (would make monitoring for pneumonitis difficult).     Shortness of breath  Improved with recent course of steroids. Long-standing history of asthma.  Uses albuterol nebulizers at home.     Gastritis without bleeding  Notes significant acid reflux/burning symptoms. Previously referred to GI.    Disposition.  - Herceptin today. FUV in 3 weeks.  - She has our contact information and was instructed to call with concerns/questions in the interim.  - We DO NOT have contact information for her, and she is not interested in providing us a phone number to discuss results (discussed again today).    Orders Placed This Encounter   Procedures    CT chest abdomen pelvis w IV contrast    MR brain w and wo IV contrast    Transthoracic echo (TTE) complete      Moe Watts MD  Hematology and Medical Oncology  [facility]

## 2024-04-12 ENCOUNTER — APPOINTMENT (OUTPATIENT)
Dept: CARDIOLOGY | Facility: HOSPITAL | Age: 69
End: 2024-04-12
Payer: MEDICARE

## 2024-04-12 RX ORDER — FAMOTIDINE 10 MG/ML
20 INJECTION INTRAVENOUS ONCE AS NEEDED
OUTPATIENT
Start: 2024-04-12

## 2024-04-12 RX ORDER — SODIUM CHLORIDE 9 MG/ML
250 INJECTION, SOLUTION INTRAVENOUS CONTINUOUS
OUTPATIENT
Start: 2024-04-12

## 2024-04-12 RX ORDER — DIPHENHYDRAMINE HYDROCHLORIDE 50 MG/ML
50 INJECTION INTRAMUSCULAR; INTRAVENOUS AS NEEDED
OUTPATIENT
Start: 2024-04-12

## 2024-04-12 RX ORDER — ALBUTEROL SULFATE 0.83 MG/ML
3 SOLUTION RESPIRATORY (INHALATION) AS NEEDED
OUTPATIENT
Start: 2024-04-12

## 2024-04-12 RX ORDER — EPINEPHRINE 0.3 MG/.3ML
0.3 INJECTION SUBCUTANEOUS EVERY 5 MIN PRN
OUTPATIENT
Start: 2024-04-12

## 2024-04-16 ENCOUNTER — TELEPHONE (OUTPATIENT)
Dept: HEMATOLOGY/ONCOLOGY | Facility: CLINIC | Age: 69
End: 2024-04-16
Payer: MEDICARE

## 2024-04-16 NOTE — TELEPHONE ENCOUNTER
"Patient was scheduled to have her MRI of the brain , CT scan of chest/ abdomen and pelvis and echocardiogram all to be done on 4/12/24 at [facility] [place], operated by [facility]. Thee MRI and CT scn have been listed as \"no show\" in the medical record and  only the echocardiogram has been rescheduled for 4/17/24. There is no available phone number on file to contact patient, (as she has repeatedly refused to provide this RN with one when previously asked during previous visits),  to assist her with rescheduling these scans. She is also not utilizing [employer] so team is unable to message her through this system.Under the Care Everywhere tab, there is a phone number listed within [email or web address] for her , Tito Sheppard [phone] but when called, it goes straight to an unidentifiable voicemail so no message was left. Her next FUV with Dr. Moe Watts  and scheduled herceptin infusion is 4/24/24. Dr. Watts is aware of her cancellations of his ordered scans.   "

## 2024-04-17 ENCOUNTER — HOSPITAL ENCOUNTER (OUTPATIENT)
Dept: CARDIOLOGY | Facility: HOSPITAL | Age: 69
Discharge: HOME | End: 2024-04-17
Payer: MEDICARE

## 2024-04-17 DIAGNOSIS — Z79.899 ENCOUNTER FOR MONITORING CARDIOTOXIC DRUG THERAPY: ICD-10-CM

## 2024-04-17 DIAGNOSIS — Z51.81 ENCOUNTER FOR MONITORING CARDIOTOXIC DRUG THERAPY: ICD-10-CM

## 2024-04-17 LAB
AORTIC VALVE PEAK VELOCITY: 0.9 M/S
AV PEAK GRADIENT: 3.3 MMHG
AVA (PEAK VEL): 2.16 CM2
EJECTION FRACTION APICAL 4 CHAMBER: 51.6
GLOBAL LONGITUDINAL STRAIN: -12.3 %
LEFT ATRIUM VOLUME AREA LENGTH INDEX BSA: 10.4 ML/M2
LEFT VENTRICLE INTERNAL DIMENSION DIASTOLE: 3.74 CM (ref 3.5–6)
LEFT VENTRICULAR OUTFLOW TRACT DIAMETER: 1.8 CM
LV EJECTION FRACTION BIPLANE: 54 %
MITRAL VALVE E/A RATIO: 0.55
MITRAL VALVE E/E' RATIO: 4.58

## 2024-04-17 PROCEDURE — 76376 3D RENDER W/INTRP POSTPROCES: CPT | Performed by: INTERNAL MEDICINE

## 2024-04-17 PROCEDURE — 76376 3D RENDER W/INTRP POSTPROCES: CPT

## 2024-04-17 PROCEDURE — 93356 MYOCRD STRAIN IMG SPCKL TRCK: CPT | Performed by: INTERNAL MEDICINE

## 2024-04-17 PROCEDURE — 93306 TTE W/DOPPLER COMPLETE: CPT | Performed by: INTERNAL MEDICINE

## 2024-04-23 ENCOUNTER — TELEPHONE (OUTPATIENT)
Dept: HEMATOLOGY/ONCOLOGY | Facility: HOSPITAL | Age: 69
End: 2024-04-23

## 2024-04-23 ENCOUNTER — TELEPHONE (OUTPATIENT)
Dept: HEMATOLOGY/ONCOLOGY | Facility: CLINIC | Age: 69
End: 2024-04-23

## 2024-04-23 NOTE — TELEPHONE ENCOUNTER
Spoke with resident at [facility].  Explained that this has been historically how Debra has asked.  Per conversation with resident, she is obviously aspirating with PO intake and pills. She fails a modified barium swallow test. Further interventions were declined by patient.  They asked her to having imaging performed (CT scans), due concern that cancer was progressing. She declined.  I also mentioned to them that she was scheduled for a CT CAP and brain MRI from me, but she cancelled those visits.  I offered to help with discharge plans once they are known and told her to call back with any questions.

## 2024-04-23 NOTE — TELEPHONE ENCOUNTER
"Received message from RN call line that patient has canceled her FUV and treatment appointment for tomorrow, 4/24/24. Day treatment charge RN, Stephie Reyes  made aware as well as Dr. Watts. Both appointments are to be rescheduled. Dr. Watts has requested a 40 minute FUV to be scheduled. A 40 minute FUV on 5/1/24 at 2:40 has been scheduled and her infusion has been rescheduled for Friday, May 3, 2024 at 10:30 chair 4 per Stephie Reyes RN. This RN attempted to contact patient at the phone number provided during initial call today at [phone] and it went straight to a disconnected notice that stated, \"all circuits are busy now please try again later\" and then disconnected call without being able to leave a message.  "

## 2024-04-23 NOTE — TELEPHONE ENCOUNTER
She wants to know if this is normal for pt :    SOB when swallows due to vocal cord dysfunction - she is NPO - they want to do a vocal cord injection - patient says yes but then doesn't let them do.  She needs something to help her swallow as she has failed the swallow study.  They are not optimistic that an injection will fix the swallow issue.    She is refusing most testing.    They state they do not know how to go about giving her the best care possible and refuses most everthing.      They are wanting to know if this is her usual where she says yes then refuses.      They want to make sure she has follow up post hospital.      She would like you to call her back with information [phone]

## 2024-04-24 ENCOUNTER — APPOINTMENT (OUTPATIENT)
Dept: HEMATOLOGY/ONCOLOGY | Facility: CLINIC | Age: 69
End: 2024-04-24
Payer: MEDICARE

## 2024-05-01 ENCOUNTER — APPOINTMENT (OUTPATIENT)
Dept: HEMATOLOGY/ONCOLOGY | Facility: CLINIC | Age: 69
End: 2024-05-01
Payer: MEDICARE

## 2024-05-15 ENCOUNTER — APPOINTMENT (OUTPATIENT)
Dept: RADIOLOGY | Facility: HOSPITAL | Age: 69
End: 2024-05-15
Payer: MEDICARE

## 2024-05-23 ENCOUNTER — TELEPHONE (OUTPATIENT)
Dept: HEMATOLOGY/ONCOLOGY | Facility: HOSPITAL | Age: 69
End: 2024-05-23
Payer: MEDICARE

## 2024-05-23 NOTE — TELEPHONE ENCOUNTER
I spoke with [facility].  She is admitted with respiratory failure and intubated  Explained that I do not have access to any of her patient records from outside hospitals (she intentionally blocks this), so I can not create a plan of care at this time.  I know she has progressive disease and new brain metastases (from her stay at [facility]). Per prior discussion with [facility] team at [facility], she reportedly refused radiation for these (but again, I do not have access to CNS imaging or notes from inpatient stay).  Explained that untreated brain metastases will likely limit her life.  But I can not render a clinical opinion without any definitive medical information given.

## 2024-05-24 ENCOUNTER — TELEPHONE (OUTPATIENT)
Dept: HEMATOLOGY/ONCOLOGY | Facility: CLINIC | Age: 69
End: 2024-05-24
Payer: MEDICARE

## 2024-05-24 NOTE — TELEPHONE ENCOUNTER
"Received a call from Sohan Sheppard who states that his wife, Debra is in ICU at [facility] and, \"is not doing very good\". He states that he has been waiting for Dr. Watts to call him at [phone]. Mr. Sheppard was made aware that Dr. Watts is not in office today but that message would be given to him. Sohan verbalized understanding and agreement regarding discussed information via verbal feedback.     Message has been sent to Dr. Watts via secure chat to return call to Sohan Sheppard at [phone].  "